# Patient Record
Sex: MALE | Race: BLACK OR AFRICAN AMERICAN | Employment: FULL TIME | URBAN - METROPOLITAN AREA
[De-identification: names, ages, dates, MRNs, and addresses within clinical notes are randomized per-mention and may not be internally consistent; named-entity substitution may affect disease eponyms.]

---

## 2017-04-04 ENCOUNTER — OFFICE VISIT (OUTPATIENT)
Dept: FAMILY MEDICINE | Facility: CLINIC | Age: 37
End: 2017-04-04
Payer: COMMERCIAL

## 2017-04-04 VITALS
DIASTOLIC BLOOD PRESSURE: 80 MMHG | HEART RATE: 88 BPM | TEMPERATURE: 98.9 F | WEIGHT: 153.4 LBS | RESPIRATION RATE: 12 BRPM | OXYGEN SATURATION: 100 % | HEIGHT: 71 IN | BODY MASS INDEX: 21.48 KG/M2 | SYSTOLIC BLOOD PRESSURE: 126 MMHG

## 2017-04-04 DIAGNOSIS — F17.200 TOBACCO USE DISORDER: ICD-10-CM

## 2017-04-04 DIAGNOSIS — M50.30 DDD (DEGENERATIVE DISC DISEASE), CERVICAL: ICD-10-CM

## 2017-04-04 DIAGNOSIS — R63.0 ANOREXIA: ICD-10-CM

## 2017-04-04 DIAGNOSIS — J10.1 INFLUENZA A: Primary | ICD-10-CM

## 2017-04-04 PROCEDURE — 99214 OFFICE O/P EST MOD 30 MIN: CPT | Performed by: FAMILY MEDICINE

## 2017-04-04 RX ORDER — PREDNISONE 20 MG/1
TABLET ORAL
Qty: 21 TABLET | Refills: 0 | Status: SHIPPED | OUTPATIENT
Start: 2017-04-04 | End: 2017-07-03

## 2017-04-04 ASSESSMENT — PAIN SCALES - GENERAL: PAINLEVEL: NO PAIN (0)

## 2017-04-04 NOTE — MR AVS SNAPSHOT
"              After Visit Summary   4/4/2017    Adrian Kurtz    MRN: [id number]           Patient Information     Date Of Birth          1980        Visit Information        Provider Department      4/4/2017 5:20 PM Charmaine Oliver MD [facility]        Today's Diagnoses     Influenza A    -  1    Anorexia        Tobacco use disorder        DDD (degenerative disc disease), cervical           Follow-ups after your visit        Follow-up notes from your care team     Return if symptoms worsen or fail to improve.      Future tests that were ordered for you today     Open Future Orders        Priority Expected Expires Ordered    XR Cervic/Thorac Transforaminal Inj Routine 4/4/2017 4/4/2018 4/4/2017            Who to contact     If you have questions or need follow up information about today's clinic visit or your schedule please contact [facility] directly at [phone].  Normal or non-critical lab and imaging results will be communicated to you by Lottayhart, letter or phone within 4 business days after the clinic has received the results. If you do not hear from us within 7 days, please contact the clinic through MyChart or phone. If you have a critical or abnormal lab result, we will notify you by phone as soon as possible.  Submit refill requests through [employer] or call your pharmacy and they will forward the refill request to us. Please allow 3 business days for your refill to be completed.          Additional Information About Your Visit        Lottayhart Information     [employer] lets you send messages to your doctor, view your test results, renew your prescriptions, schedule appointments and more. To sign up, go to [email or web address] . Click on \"Log in\" on the left side of the screen, which will take you to the Welcome page. Then click on \"Sign up Now\" on the right side of the page.     You will be asked to enter the access code listed below, as well as some personal " "information. Please follow the directions to create your username and password.     Your access code is: [email or web address]  Expires: 7/3/2017  5:35 PM     Your access code will  in 90 days. If you need help or a new code, please call your [place] clinic or [phone].        Care EveryWhere ID     This is your Care EveryWhere ID. This could be used by other organizations to access your [place] medical records  [id number]        Your Vitals Were     Pulse Temperature Respirations Height Pulse Oximetry BMI (Body Mass Index)    88 98.9  F (37.2  C) (Oral) 12 1.791 m (5' 10.5\") 100% 21.7 kg/m2       Blood Pressure from Last 3 Encounters:   17 126/80   16 104/60   04/06/15 118/76    Weight from Last 3 Encounters:   17 69.6 kg (153 lb 6.4 oz)   16 73.8 kg (162 lb 11.2 oz)   04/06/15 82.3 kg (181 lb 8 oz)                 Today's Medication Changes          These changes are accurate as of: 17  5:35 PM.  If you have any questions, ask your nurse or doctor.               Start taking these medicines.        Dose/Directions    predniSONE 20 MG tablet   Commonly known as:  DELTASONE   Used for:  Influenza A, Anorexia   Started by:  Charmaine Oliver MD        3 tabs daily for 4 days, 2 tabs x 3 days, 1 tab x 3 days   Quantity:  21 tablet   Refills:  0       varenicline 0.5 MG X 11 & 1 MG X 42 tablet   Commonly known as:  CHANTIX STARTING MONTH KATIE   Used for:  Tobacco use disorder   Started by:  Charmaine Oliver MD        Take 0.5 mg tab daily for 3 days, then 0.5 mg tab twice daily for 4 days, then 1 mg twice daily.   Quantity:  53 tablet   Refills:  0            Where to get your medicines      [facility] [address] [facility] [address] [facility]  [address] [facility] [address]    Hours:  24-hours Phone:  [phone]     predniSONE 20 MG tablet    varenicline 0.5 MG X 11 & 1 MG X 42 " tablet                Primary Care Provider Office Phone # Fax #    Charmaine Eliel Oliver [phone]       [facility] [address] [place] [address]        Thank you!     Thank you for choosing [facility]  for your care. Our goal is always to provide you with excellent care. Hearing back from our patients is one way we can continue to improve our services. Please take a few minutes to complete the written survey that you may receive in the mail after your visit with us. Thank you!             Your Updated Medication List - Protect others around you: Learn how to safely use, store and throw away your medicines at www.disposemymeds.org.          This list is accurate as of: 4/4/17  5:35 PM.  Always use your most recent med list.                   Brand Name Dispense Instructions for use    ALEVE 220 MG capsule   Generic drug:  naproxen sodium      Take 220 mg by mouth 2 times daily (with meals).       ibuprofen 200 MG capsule      Take 200 mg by mouth every 4 hours as needed.       predniSONE 20 MG tablet    DELTASONE    21 tablet    3 tabs daily for 4 days, 2 tabs x 3 days, 1 tab x 3 days       varenicline 0.5 MG X 11 & 1 MG X 42 tablet    CHANTIX STARTING MONTH KATIE    53 tablet    Take 0.5 mg tab daily for 3 days, then 0.5 mg tab twice daily for 4 days, then 1 mg twice daily.

## 2017-04-04 NOTE — PROGRESS NOTES
"  SUBJECTIVE:                                                    Adrian Kurtz is a 36 year old male who presents to clinic today for the following health issues:      ED/UC Followup:    Facility:  [facility]  Date of visit: 3/20/17, 3/13/17  Reason for visit: flu sx's - dx's with influenza A  Current Status: pt states feeling better, still experiencing low appetite -      SUBJECTIVE:  Here today in follow-up of influenza. Reviewed ER visits ×2 through care everywhere. Prescribed Tamiflu initially. Continue to trouble with poor appetite and vomiting. Was seen through [facility] where he was given Zofran. His flu symptoms have improved significantly. No more fevers or chills. Cough is fading. Still very poor appetite. He says he was given some steroids which really helped him feel better including increasing his appetite. I do not see any record of that in care everywhere however. But is wondering about another course of steroids. Also notes that he like to quit smoking.  He thinks his insurance will cover Chantix and he like to give that a try. Has a known history of degenerative cervical disc disease with an MRI in 2015 showing a right-sided C5-C6 disc. At one point he was going to set up an epidural injection but insurance was initiated. Continues to have neck pain that radiates down his right arm. No weakness or numbness.    Review of systems otherwise negative.  Past medical, family, and social history reviewed and updated in chart.    OBJECTIVE:  /80 (BP Location: Right arm, Patient Position: Right side, Cuff Size: Adult Regular)  Pulse 88  Temp 98.9  F (37.2  C) (Oral)  Resp 12  Ht 1.791 m (5' 10.5\")  Wt 69.6 kg (153 lb 6.4 oz)  SpO2 100%  BMI 21.7 kg/m2  Alert, pleasant, upbeat, and in no apparent discomfort.  Ears normal. Throat and pharynx normal. Neck supple. No adenopathy or masses in the neck or supraclavicular regions. Sinuses non tender.  S1 and S2 normal, no murmurs, " clicks, gallops or rubs. Regular rate and rhythm. Chest is clear; no wheezes or rales. No edema or JVD.  Past labs reviewed with the patient.     ASSESSMENT / PLAN:  (J10.1) Influenza A  (primary encounter diagnosis)  Comment: Discussed mechanism of action of the proposed medication, as well as potential effects, both good and bad.  Patient expressed understanding and agreed with treatment.   Plan: predniSONE (DELTASONE) 20 MG tablet            (R63.0) Anorexia  Comment: as above   Plan: predniSONE (DELTASONE) 20 MG tablet            (F17.200) Tobacco use disorder  Comment: Discussed mechanism of action of the proposed medication, as well as potential effects, both good and bad.  Patient expressed understanding and agreed with treatment.   Plan: varenicline (CHANTIX STARTING MONTH KATIE) 0.5 MG        X 11 & 1 MG X 42 tablet            (M50.30) DDD (degenerative disc disease), cervical  Comment: We will set up epidural injection and follow-up based upon this  Plan: XR Cervic/Thorac Transforaminal Inj            Follow up as needed   S. Eliel Oliver MD    (Chart documentation completed in part with Dragon voice-recognition software.  Even though reviewed some grammatical, spelling, and word errors may remain.)

## 2017-04-04 NOTE — NURSING NOTE
"Chief Complaint   Patient presents with     Urgent Care       Initial /80 (BP Location: Right arm, Patient Position: Right side, Cuff Size: Adult Regular)  Pulse 88  Temp 98.9  F (37.2  C) (Oral)  Resp 12  Ht 1.791 m (5' 10.5\")  Wt 69.6 kg (153 lb 6.4 oz)  SpO2 100%  BMI 21.7 kg/m2 Estimated body mass index is 21.7 kg/(m^2) as calculated from the following:    Height as of this encounter: 1.791 m (5' 10.5\").    Weight as of this encounter: 69.6 kg (153 lb 6.4 oz).  Medication Reconciliation: complete     Will Ghazal SOMMERS      "

## 2017-05-22 DIAGNOSIS — F17.200 TOBACCO USE DISORDER: ICD-10-CM

## 2017-05-22 DIAGNOSIS — M54.50 MIDLINE LOW BACK PAIN WITHOUT SCIATICA, UNSPECIFIED CHRONICITY: Primary | ICD-10-CM

## 2017-05-22 NOTE — TELEPHONE ENCOUNTER
[facility] Call Center    Phone Message    Name of Caller: Adrian    Phone Number: Home number on file [phone] (home) or Cell number on file:    No relevant phone numbers on file.       Best time to return call: ANy    May a detailed message be left on voicemail: yes    Relation to patient: Self    Reason for Call: Order(s): Other:   What is being requested: Requesting MRI orders   Reason for requested: Needs MRI in order get injection   Date needed: asap   Provider name: Dr. Oliver      Action Taken: Message routed to:  Other:  Clinic

## 2017-05-22 NOTE — TELEPHONE ENCOUNTER
Called patient - they are requesting an MRI be completed of back before epidural injection.    Pt also requesting refill of chantix (wants to start over) -- preferred pharmacy selected    Will Ghazal SOMMERS

## 2017-06-02 ENCOUNTER — TELEPHONE (OUTPATIENT)
Dept: FAMILY MEDICINE | Facility: CLINIC | Age: 37
End: 2017-06-02

## 2017-06-02 DIAGNOSIS — M50.30 DDD (DEGENERATIVE DISC DISEASE), CERVICAL: Primary | ICD-10-CM

## 2017-06-02 NOTE — TELEPHONE ENCOUNTER
Reason for Call: Request for an order or referral:    Order or referral being requested: MRI CERV    Date needed: as soon as possible    Has the patient been seen by the PCP for this problem? YES    Additional comments: PT IS AT [facility] AT  RIGHT NOW AND ORDERS SAY IS FOR LUMBAR AND PT STATES IT SHOULD BE FOR CERVICAL SPINE, NEED A NEW ORDER.  CLOSES AT 5 PM TODAY AND NEED ASAP    Phone number Patient can be reached at:  Other phone number:    [employer]  [phone]     IMAGING     Best Time:  RIGHT NOW    Can we leave a detailed message on this number?  YES    Call taken on 6/2/2017 at 4:35 PM by Mea Bianchi

## 2017-06-23 ENCOUNTER — RADIANT APPOINTMENT (OUTPATIENT)
Dept: GENERAL RADIOLOGY | Facility: CLINIC | Age: 37
End: 2017-06-23
Attending: FAMILY MEDICINE
Payer: COMMERCIAL

## 2017-06-23 VITALS — HEART RATE: 61 BPM | SYSTOLIC BLOOD PRESSURE: 108 MMHG | OXYGEN SATURATION: 97 % | DIASTOLIC BLOOD PRESSURE: 77 MMHG

## 2017-06-23 DIAGNOSIS — M50.30 DDD (DEGENERATIVE DISC DISEASE), CERVICAL: ICD-10-CM

## 2017-06-23 PROCEDURE — 62321 NJX INTERLAMINAR CRV/THRC: CPT | Performed by: RADIOLOGY

## 2017-06-23 RX ORDER — IOPAMIDOL 408 MG/ML
10 INJECTION, SOLUTION INTRATHECAL ONCE
Status: COMPLETED | OUTPATIENT
Start: 2017-06-23 | End: 2017-06-23

## 2017-06-23 RX ORDER — BETAMETHASONE SODIUM PHOSPHATE AND BETAMETHASONE ACETATE 3; 3 MG/ML; MG/ML
6 INJECTION, SUSPENSION INTRA-ARTICULAR; INTRALESIONAL; INTRAMUSCULAR; SOFT TISSUE ONCE
Status: COMPLETED | OUTPATIENT
Start: 2017-06-23 | End: 2017-06-23

## 2017-06-23 RX ADMIN — IOPAMIDOL 1 ML: 408 INJECTION, SOLUTION INTRATHECAL at 15:30

## 2017-06-23 RX ADMIN — BETAMETHASONE SODIUM PHOSPHATE AND BETAMETHASONE ACETATE 3 ML: 3; 3 INJECTION, SUSPENSION INTRA-ARTICULAR; INTRALESIONAL; INTRAMUSCULAR; SOFT TISSUE at 15:30

## 2017-06-23 NOTE — PROGRESS NOTES
: Adrian was seen in X-ray today for a cervical epidural injection. Patient rated pain before procedure 7/10. After procedure patient rated pain 5/10. This pain level is acceptable to patient. Patient discharged home with his wife.

## 2017-06-30 ENCOUNTER — RADIANT APPOINTMENT (OUTPATIENT)
Dept: MRI IMAGING | Facility: CLINIC | Age: 37
End: 2017-06-30
Attending: FAMILY MEDICINE
Payer: COMMERCIAL

## 2017-06-30 DIAGNOSIS — M54.2 NECK PAIN: ICD-10-CM

## 2017-06-30 PROCEDURE — 72141 MRI NECK SPINE W/O DYE: CPT | Performed by: RADIOLOGY

## 2017-07-03 ENCOUNTER — TELEPHONE (OUTPATIENT)
Dept: FAMILY MEDICINE | Facility: CLINIC | Age: 37
End: 2017-07-03

## 2017-07-03 DIAGNOSIS — M54.12 CERVICAL RADICULOPATHY: Primary | ICD-10-CM

## 2017-07-03 RX ORDER — PREDNISONE 20 MG/1
TABLET ORAL
Qty: 21 TABLET | Refills: 0 | Status: SHIPPED | OUTPATIENT
Start: 2017-07-03 | End: 2017-12-22

## 2017-07-03 NOTE — TELEPHONE ENCOUNTER
"Okay to send a copy of the results as well. Looks like it was about the same as the last one in 2015. Still mild narrowing of the canal at C5-C6 and C6-C7  So no significant change that would make surgery \"necessary\" - I'm sure it is still an option if the epidural steroids don't work  "

## 2017-07-03 NOTE — TELEPHONE ENCOUNTER
Reason for Call:  Request for results:    Name of test or procedure: wants mri results for spine and neck call back    Date of test of procedure: 6/30/17    Location of the test or procedure: [facility]    OK to leave the result message on voice mail or with a family member? No I want to talk with MD    Phone number Patient can be reached at:  Home number on file [phone] (home)    Additional comments: any    Call taken on 7/3/2017 at 4:26 PM by Essence Navarro

## 2017-07-03 NOTE — TELEPHONE ENCOUNTER
Informed pt of message below. Pt says the steroids are not working wondering if he can get a double dose the next injection.     Mailed results.      Pt also requesting an rx  for prednisone as well?    Deepa Uriostegui MA

## 2017-08-11 ENCOUNTER — RADIANT APPOINTMENT (OUTPATIENT)
Dept: GENERAL RADIOLOGY | Facility: CLINIC | Age: 37
End: 2017-08-11
Attending: FAMILY MEDICINE
Payer: COMMERCIAL

## 2017-08-11 VITALS — SYSTOLIC BLOOD PRESSURE: 116 MMHG | DIASTOLIC BLOOD PRESSURE: 70 MMHG | HEART RATE: 77 BPM | OXYGEN SATURATION: 99 %

## 2017-08-11 DIAGNOSIS — M50.30 DDD (DEGENERATIVE DISC DISEASE), CERVICAL: ICD-10-CM

## 2017-08-11 PROCEDURE — 64479 NJX AA&/STRD TFRM EPI C/T 1: CPT | Performed by: RADIOLOGY

## 2017-08-11 RX ORDER — DEXAMETHASONE SODIUM PHOSPHATE 10 MG/ML
10 INJECTION, SOLUTION INTRAMUSCULAR; INTRAVENOUS EVERY 6 HOURS
Status: SHIPPED | OUTPATIENT
Start: 2017-08-11

## 2017-08-11 RX ORDER — DEXAMETHASONE SODIUM PHOSPHATE 10 MG/ML
10 INJECTION, SOLUTION INTRAMUSCULAR; INTRAVENOUS ONCE
Status: COMPLETED | OUTPATIENT
Start: 2017-08-11 | End: 2017-08-11

## 2017-08-11 RX ORDER — IOPAMIDOL 408 MG/ML
10 INJECTION, SOLUTION INTRATHECAL ONCE
Status: COMPLETED | OUTPATIENT
Start: 2017-08-11 | End: 2017-08-11

## 2017-08-11 RX ADMIN — IOPAMIDOL 1 ML: 408 INJECTION, SOLUTION INTRATHECAL at 11:55

## 2017-08-11 RX ADMIN — DEXAMETHASONE SODIUM PHOSPHATE 10 MG: 10 INJECTION, SOLUTION INTRAMUSCULAR; INTRAVENOUS at 11:55

## 2017-08-11 NOTE — PROGRESS NOTES
: Adrian was seen in X-ray today for a cervical transforaminal injection. Patient rated pain before procedure 6/10. After procedure patient rated pain 1/10. This pain level is acceptable to patient. Patient discharged home with his wife.

## 2017-12-15 ENCOUNTER — TELEPHONE (OUTPATIENT)
Dept: FAMILY MEDICINE | Facility: CLINIC | Age: 37
End: 2017-12-15

## 2017-12-15 NOTE — TELEPHONE ENCOUNTER
Reason for Call:  Medication or medication refill:    Do you use a [facility]?  Name of the pharmacy and phone number for the current request:  Written rx    Name of the medication requested: Vicodin no listed in profile     Other request: Please call when approved so he can pickup prescription.    Can we leave a detailed message on this number? YES    Phone number patient can be reached at: Home number on file [phone] (home)    Best Time: any    Call taken on 12/15/2017 at 11:17 AM by Essence Navarro

## 2017-12-15 NOTE — TELEPHONE ENCOUNTER
Norco    Discontinued  Last Written Prescription Date: 02/25/15  Last Fill Quantity: 30,  # refills: 0   Last Office Visit with [facility] or [facility] prescribing provider: 04/04/17                                         Next 5 appointments (look out 90 days)     Dec 22, 2017  9:40 AM CST   PHYSICAL with Eryn Kirk PA-C   [facility] ([facility])    [address] [place] [address]   [phone]                    Routing refill request to provider for review/approval because:  Drug not on the [facility] refill protocol   Drug not active on patient's medication list  Annie Daniels RN

## 2017-12-15 NOTE — TELEPHONE ENCOUNTER
Refill denied - I've never prescribed this for the patient doesn't look like it has been prescribed in well over a year. He would need to be seen in person for this

## 2017-12-22 ENCOUNTER — OFFICE VISIT (OUTPATIENT)
Dept: FAMILY MEDICINE | Facility: CLINIC | Age: 37
End: 2017-12-22
Payer: COMMERCIAL

## 2017-12-22 VITALS
RESPIRATION RATE: 17 BRPM | WEIGHT: 160 LBS | OXYGEN SATURATION: 100 % | DIASTOLIC BLOOD PRESSURE: 60 MMHG | SYSTOLIC BLOOD PRESSURE: 100 MMHG | BODY MASS INDEX: 22.9 KG/M2 | TEMPERATURE: 98.1 F | HEIGHT: 70 IN | HEART RATE: 86 BPM

## 2017-12-22 DIAGNOSIS — Z13.1 SCREENING FOR DIABETES MELLITUS: ICD-10-CM

## 2017-12-22 DIAGNOSIS — F17.200 TOBACCO USE DISORDER: ICD-10-CM

## 2017-12-22 DIAGNOSIS — E55.9 VITAMIN D DEFICIENCY: ICD-10-CM

## 2017-12-22 DIAGNOSIS — Z13.220 SCREENING FOR HYPERLIPIDEMIA: ICD-10-CM

## 2017-12-22 DIAGNOSIS — Z11.3 SCREENING FOR STDS (SEXUALLY TRANSMITTED DISEASES): ICD-10-CM

## 2017-12-22 DIAGNOSIS — Z13.21 ENCOUNTER FOR VITAMIN DEFICIENCY SCREENING: ICD-10-CM

## 2017-12-22 DIAGNOSIS — M54.12 CERVICAL RADICULOPATHY: ICD-10-CM

## 2017-12-22 DIAGNOSIS — Z00.01 ENCOUNTER FOR ROUTINE ADULT HEALTH EXAMINATION WITH ABNORMAL FINDINGS: Primary | ICD-10-CM

## 2017-12-22 LAB
ALBUMIN SERPL-MCNC: 4.3 G/DL (ref 3.4–5)
ALP SERPL-CCNC: 50 U/L (ref 40–150)
ALT SERPL W P-5'-P-CCNC: 23 U/L (ref 0–70)
ANION GAP SERPL CALCULATED.3IONS-SCNC: 7 MMOL/L (ref 3–14)
AST SERPL W P-5'-P-CCNC: 18 U/L (ref 0–45)
BILIRUB SERPL-MCNC: 0.9 MG/DL (ref 0.2–1.3)
BUN SERPL-MCNC: 6 MG/DL (ref 7–30)
CALCIUM SERPL-MCNC: 9.4 MG/DL (ref 8.5–10.1)
CHLORIDE SERPL-SCNC: 104 MMOL/L (ref 94–109)
CHOLEST SERPL-MCNC: 217 MG/DL
CO2 SERPL-SCNC: 27 MMOL/L (ref 20–32)
CREAT SERPL-MCNC: 1.23 MG/DL (ref 0.66–1.25)
GFR SERPL CREATININE-BSD FRML MDRD: 66 ML/MIN/1.7M2
GLUCOSE SERPL-MCNC: 93 MG/DL (ref 70–99)
HBV SURFACE AB SERPL IA-ACNC: 4.35 M[IU]/ML
HBV SURFACE AG SERPL QL IA: NONREACTIVE
HCV AB SERPL QL IA: NONREACTIVE
HDLC SERPL-MCNC: 55 MG/DL
HIV 1+2 AB+HIV1 P24 AG SERPL QL IA: NONREACTIVE
LDLC SERPL CALC-MCNC: 139 MG/DL
NONHDLC SERPL-MCNC: 162 MG/DL
POTASSIUM SERPL-SCNC: 4.5 MMOL/L (ref 3.4–5.3)
PROT SERPL-MCNC: 7.6 G/DL (ref 6.8–8.8)
SODIUM SERPL-SCNC: 138 MMOL/L (ref 133–144)
TRIGL SERPL-MCNC: 114 MG/DL

## 2017-12-22 PROCEDURE — 86780 TREPONEMA PALLIDUM: CPT | Performed by: PHYSICIAN ASSISTANT

## 2017-12-22 PROCEDURE — 36415 COLL VENOUS BLD VENIPUNCTURE: CPT | Performed by: PHYSICIAN ASSISTANT

## 2017-12-22 PROCEDURE — 99214 OFFICE O/P EST MOD 30 MIN: CPT | Mod: 25 | Performed by: PHYSICIAN ASSISTANT

## 2017-12-22 PROCEDURE — 87389 HIV-1 AG W/HIV-1&-2 AB AG IA: CPT | Performed by: PHYSICIAN ASSISTANT

## 2017-12-22 PROCEDURE — 87340 HEPATITIS B SURFACE AG IA: CPT | Performed by: PHYSICIAN ASSISTANT

## 2017-12-22 PROCEDURE — 80053 COMPREHEN METABOLIC PANEL: CPT | Performed by: PHYSICIAN ASSISTANT

## 2017-12-22 PROCEDURE — 87591 N.GONORRHOEAE DNA AMP PROB: CPT | Performed by: PHYSICIAN ASSISTANT

## 2017-12-22 PROCEDURE — 86706 HEP B SURFACE ANTIBODY: CPT | Performed by: PHYSICIAN ASSISTANT

## 2017-12-22 PROCEDURE — 87491 CHLMYD TRACH DNA AMP PROBE: CPT | Performed by: PHYSICIAN ASSISTANT

## 2017-12-22 PROCEDURE — 82306 VITAMIN D 25 HYDROXY: CPT | Performed by: PHYSICIAN ASSISTANT

## 2017-12-22 PROCEDURE — 80061 LIPID PANEL: CPT | Performed by: PHYSICIAN ASSISTANT

## 2017-12-22 PROCEDURE — 86803 HEPATITIS C AB TEST: CPT | Performed by: PHYSICIAN ASSISTANT

## 2017-12-22 PROCEDURE — 99395 PREV VISIT EST AGE 18-39: CPT | Performed by: PHYSICIAN ASSISTANT

## 2017-12-22 RX ORDER — HYDROCODONE BITARTRATE AND ACETAMINOPHEN 5; 325 MG/1; MG/1
TABLET ORAL
Qty: 60 TABLET | Refills: 0 | Status: SHIPPED | OUTPATIENT
Start: 2017-12-22 | End: 2018-01-22

## 2017-12-22 ASSESSMENT — PAIN SCALES - GENERAL: PAINLEVEL: SEVERE PAIN (7)

## 2017-12-22 NOTE — MR AVS SNAPSHOT
After Visit Summary   12/22/2017    Adrian Kurtz    MRN: [id number]           Patient Information     Date Of Birth          1980        Visit Information        Provider Department      12/22/2017 9:40 AM Eryn Kirk PA-C [facility]        Today's Diagnoses     Cervical radiculopathy    -  1    Encounter for routine adult health examination with abnormal findings        Screening for hyperlipidemia        Screening for diabetes mellitus        Encounter for vitamin deficiency screening        Screening for STDs (sexually transmitted diseases)        Tobacco use disorder          Care Instructions    Follow up with orthopedic surgeon.   Work on quitting smoking. Set quit date and start chantix 2 weeks before quit date   Follow up with us in one month if need more pain medication.       At [facility], we strive to deliver an exceptional experience to you, every time we see you.  If you receive a survey in the mail, please send us back your thoughts. We really do value your feedback.    Suggested websites for health information:  [employer] : Up to date and easily searchable information on multiple topics.  Www.medlineplus.gov : medication info, interactive tutorials, watch real surgeries online  Www.familydoctor.org : good info from the Academy of Family Physicians  Www.cdc.gov : public health info, travel advisories, epidemics (H1N1)  Www.aap.org : children's health info, normal development, vaccinations  Www.health.state.[place].us : [place] dept of health, public health issues in [place], N1N1    Your care team:     Family Medicine   DREW Serrano MD Emily Bunt, APRN CNP   S. MD Hattie Shi MD Angela Wermerskirchen, MD         Clinic hours: Monday - Wednesday 7 am-7 pm   Thursdays and Fridays 7 am-5 pm.     [facility]: Monday - Friday 11 am-9 pm,   Saturday and Sunday 9 am-5  pm.    [facility]: Monday -Thursday 8 am-8 pm; Friday 8 am-6 pm; Saturday and Sunday 9 am-5 pm.     [facility]: Monday - Thursday 8 am - 7 pm; Friday 8 am - 6 pm    Clinic: [phone]   [facility]: [phone]   [facility]: [phone]          Preventive Health Recommendations  Male Ages 26 - 39    Yearly exam:             See your health care provider every year in order to  o   Review health changes.   o   Discuss preventive care.    o   Review your medicines if your doctor has prescribed any.    You should be tested each year for STDs (sexually transmitted diseases), if you re at risk.     After age 35, talk to your provider about cholesterol testing. If you are at risk for heart disease, have your cholesterol tested at least every 5 years.     If you are at risk for diabetes, you should have a diabetes test (fasting glucose).  Shots: Get a flu shot each year. Get a tetanus shot every 10 years.     Nutrition:    Eat at least 5 servings of fruits and vegetables daily.     Eat whole-grain bread, whole-wheat pasta and brown rice instead of white grains and rice.     Talk to your provider about Calcium and Vitamin D.     Lifestyle    Exercise for at least 150 minutes a week (30 minutes a day, 5 days a week). This will help you control your weight and prevent disease.     Limit alcohol to one drink per day.     No smoking.     Wear sunscreen to prevent skin cancer.     See your dentist every six months for an exam and cleaning.             Follow-ups after your visit        Additional Services     ORTHO  REFERRAL       [facility] is referring you to the Orthopedic  Services at [facility].       The  Representative will assist you in the coordination of your Orthopedic and Musculoskeletal Care as prescribed by your physician.    The  Representative will call you within 1  "business day to help schedule your appointment, or you may contact the  Representative at:    All areas ~ [phone]     Type of Referral : Spine: Cervical / Thoracic: Cervical / Thoracic Spine Surgeon        Timeframe requested: Routine    Coverage of these services is subject to the terms and limitations of your health insurance plan.  Please call member services at your health plan with any benefit or coverage questions.      If X-rays, CT or MRI's have been performed, please contact the facility where they were done to arrange for , prior to your scheduled appointment.  Please bring this referral request to your appointment and present it to your specialist.                  Who to contact     If you have questions or need follow up information about today's clinic visit or your schedule please contact [facility] directly at [phone].  Normal or non-critical lab and imaging results will be communicated to you by trinkethart, letter or phone within 4 business days after the clinic has received the results. If you do not hear from us within 7 days, please contact the clinic through [employer] or phone. If you have a critical or abnormal lab result, we will notify you by phone as soon as possible.  Submit refill requests through [employer] or call your pharmacy and they will forward the refill request to us. Please allow 3 business days for your refill to be completed.          Additional Information About Your Visit        [employer] Information     [employer] lets you send messages to your doctor, view your test results, renew your prescriptions, schedule appointments and more. To sign up, go to [email or web address] . Click on \"Log in\" on the left side of the screen, which will take you to the Welcome page. Then click on \"Sign up Now\" on the right side of the page.     You will be asked to enter the access code listed below, as well as some personal information. Please follow the " "directions to create your username and password.     Your access code is: [email or web address]  Expires: 3/22/2018 10:25 AM     Your access code will  in 90 days. If you need help or a new code, please call your [place] clinic or [phone].        Care EveryWhere ID     This is your Care EveryWhere ID. This could be used by other organizations to access your [place] medical records  [id number]        Your Vitals Were     Pulse Temperature Respirations Height Pulse Oximetry BMI (Body Mass Index)    86 98.1  F (36.7  C) (Oral) 17 1.765 m (5' 9.5\") 100% 23.29 kg/m2       Blood Pressure from Last 3 Encounters:   17 100/60   17 116/70   17 108/77    Weight from Last 3 Encounters:   17 72.6 kg (160 lb)   17 69.6 kg (153 lb 6.4 oz)   16 73.8 kg (162 lb 11.2 oz)              We Performed the Following     25 Hydroxyvitamin D2 and D3     Anti Treponema     Chlamydia trachomatis PCR     Comprehensive metabolic panel     Hepatitis B Surface Antibody     Hepatitis B surface antigen     Hepatitis C antibody     HIV Antigen Antibody Combo     Lipid panel reflex to direct LDL Fasting     Neisseria gonorrhoeae PCR     ORTHO  REFERRAL          Today's Medication Changes          These changes are accurate as of: 17 10:32 AM.  If you have any questions, ask your nurse or doctor.               Start taking these medicines.        Dose/Directions    HYDROcodone-acetaminophen 5-325 MG per tablet   Commonly known as:  NORCO   Used for:  Cervical radiculopathy   Started by:  Eryn Kirk PA-C        1-2  At bedtime maximum 2 tablet(s) per day   Quantity:  60 tablet   Refills:  0            Where to get your medicines      These medications were sent to [facility] [id number] [facility] [address] [facility] [facility]  [address] [facility] [address]    Hours:  24-hours Phone:  [phone]     varenicline 0.5 MG X 11 & 1 MG X 42 " tablet         Some of these will need a paper prescription and others can be bought over the counter.  Ask your nurse if you have questions.     Bring a paper prescription for each of these medications     HYDROcodone-acetaminophen 5-325 MG per tablet                Primary Care Provider Office Phone # Fax #    Charmaine Eliel Oliver [phone] [address] [facility] [address]        Equal Access to Services     AURELIO BAILEY : Hadii aad ku hadasho Soomaali, waaxda luqadaha, qaybta kaalmada adeegyada, waxay idiin hayaan adeeg kharash la'aan ah. So [facility] [phone].    ATENCIÓN: Si habla esppatricia, tiene a goldberg disposición servicios gratuitos de asistencia lingüística. Llame al [phone].    We comply with applicable federal civil rights laws and [place] laws. We do not discriminate on the basis of race, color, national origin, age, disability, sex, sexual orientation, or gender identity.            Thank you!     Thank you for choosing [facility]  for your care. Our goal is always to provide you with excellent care. Hearing back from our patients is one way we can continue to improve our services. Please take a few minutes to complete the written survey that you may receive in the mail after your visit with us. Thank you!             Your Updated Medication List - Protect others around you: Learn how to safely use, store and throw away your medicines at www.disposemymeds.org.          This list is accurate as of: 12/22/17 10:32 AM.  Always use your most recent med list.                   Brand Name Dispense Instructions for use Diagnosis    ALEVE 220 MG capsule   Generic drug:  naproxen sodium      Take 220 mg by mouth 2 times daily (with meals).        HYDROcodone-acetaminophen 5-325 MG per tablet    NORCO    60 tablet    1-2  At bedtime maximum 2 tablet(s) per day    Cervical radiculopathy       ibuprofen 200 MG capsule      Take 200 mg by mouth every 4 hours as  needed.        varenicline 0.5 MG X 11 & 1 MG X 42 tablet    CHANTIX STARTING MONTH PAK    53 tablet    Take 0.5 mg tab daily for 3 days, then 0.5 mg tab twice daily for 4 days, then 1 mg twice daily.    Tobacco use disorder

## 2017-12-22 NOTE — PATIENT INSTRUCTIONS
Follow up with orthopedic surgeon.   Work on quitting smoking. Set quit date and start chantix 2 weeks before quit date   Follow up with us in one month if need more pain medication.       At [facility], we strive to deliver an exceptional experience to you, every time we see you.  If you receive a survey in the mail, please send us back your thoughts. We really do value your feedback.    Suggested websites for health information:  Www.[email or web address] : Up to date and easily searchable information on multiple topics.  Www.medlineplus.gov : medication info, interactive tutorials, watch real surgeries online  Www.familydoctor.org : good info from the Academy of Family Physicians  Www.cdc.gov : public health info, travel advisories, epidemics (H1N1)  Www.aap.org : children's health info, normal development, vaccinations  Www.health.[facility].[place].us : [place] dept of health, public health issues in [place], N1N1    Your care team:     Family Medicine   DREW Serrano MD Emily Bunt, APRN [facility]   S. MD Hattie Shi MD Angela Wermerskirchen, MD         Clinic hours: Monday - Wednesday 7 am-7 pm   Thursdays and Fridays 7 am-5 pm.     [facility]: Monday - Friday 11 am-9 pm,   Saturday and Sunday 9 am-5 pm.    [facility]: Monday -Thursday 8 am-8 pm; Friday 8 am-6 pm; Saturday and Sunday 9 am-5 pm.     [facility]: Monday Thursday 8 am   7 pm; Friday 8 am   6 pm    Clinic: [phone]   [facility]: [phone]   [facility]: [phone]          Preventive Health Recommendations  Male Ages 26 - 39    Yearly exam:             See your health care provider every year in order to  o   Review health changes.   o   Discuss preventive care.    o   Review your medicines if your doctor has prescribed any.    You should be tested each year for STDs (sexually transmitted diseases), if you re at  risk.     After age 35, talk to your provider about cholesterol testing. If you are at risk for heart disease, have your cholesterol tested at least every 5 years.     If you are at risk for diabetes, you should have a diabetes test (fasting glucose).  Shots: Get a flu shot each year. Get a tetanus shot every 10 years.     Nutrition:    Eat at least 5 servings of fruits and vegetables daily.     Eat whole-grain bread, whole-wheat pasta and brown rice instead of white grains and rice.     Talk to your provider about Calcium and Vitamin D.     Lifestyle    Exercise for at least 150 minutes a week (30 minutes a day, 5 days a week). This will help you control your weight and prevent disease.     Limit alcohol to one drink per day.     No smoking.     Wear sunscreen to prevent skin cancer.     See your dentist every six months for an exam and cleaning.

## 2017-12-22 NOTE — NURSING NOTE
"Chief Complaint   Patient presents with     Physical       Initial /60 (BP Location: Right arm, Patient Position: Chair, Cuff Size: Adult Large)  Pulse 86  Temp 98.1  F (36.7  C) (Oral)  Resp 17  Ht 1.765 m (5' 9.5\")  Wt 72.6 kg (160 lb)  SpO2 100%  BMI 23.29 kg/m2 Estimated body mass index is 23.29 kg/(m^2) as calculated from the following:    Height as of this encounter: 1.765 m (5' 9.5\").    Weight as of this encounter: 72.6 kg (160 lb).  Medication Reconciliation: complete     Tita Andrade MA       "

## 2017-12-22 NOTE — PROGRESS NOTES
SUBJECTIVE:   CC: Adrian Kurtz is an 37 year old male who presents for preventative health visit.     Healthy Habits:    Do you get at least three servings of calcium containing foods daily (dairy, green leafy vegetables, etc.)? no    Amount of exercise or daily activities, outside of work: none    Problems taking medications regularly No    Medication side effects: No    Have you had an eye exam in the past two years? no    Do you see a dentist twice per year? 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 Yes - AUDIT SCORE:     No flowsheet data found.                          Last PSA: No results found for: PSA    Reviewed orders with patient. Reviewed health maintenance and updated orders accordingly - Yes  BP Readings from Last 3 Encounters:   12/22/17 100/60   08/11/17 116/70   06/23/17 108/77    Wt Readings from Last 3 Encounters:   12/22/17 72.6 kg (160 lb)   04/04/17 69.6 kg (153 lb 6.4 oz)   07/27/16 73.8 kg (162 lb 11.2 oz)                  Patient Active Problem List   Diagnosis     Hyperlipidemia with target LDL less than 130     AR (allergic rhinitis)     Eczema     Hemorrhoids     Contusion, forearm     Laceration of left forearm     Cervicalgia     Right arm pain     Cervical radiculopathy     DDD (degenerative disc disease), cervical     Anxiety state     Insomnia      "Tobacco use disorder     Past Surgical History:   Procedure Laterality Date     OPEN REDUCTION INTERNAL FIXATION WRIST  6-04    Right wrist fracture with ORIF       Social History   Substance Use Topics     Smoking status: Current Every Day Smoker     Packs/day: 1.00     Years: 12.00     Types: Cigarettes     Smokeless tobacco: Never Used     Alcohol use No      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 Maternal Grandmother 71     C.A.D. Paternal Grandfather 66     Respiratory Brother      Sarcoidosis     CANCER Other      lung cancer uncles on both sides, pancreatic cancer - uncle, liver cancer aunt     GASTROINTESTINAL DISEASE Other      Uncle cirrhosis - alcohol     Endocrine Disease Other      nephew without parathyroid glands     Musculoskeletal Disorder Other      osteogenesis imperfecta - aunt     Hypertension No family hx of      Thyroid Disease No family hx of          Current Outpatient Prescriptions   Medication Sig Dispense Refill     varenicline (CHANTIX STARTING MONTH PAK) 0.5 MG X 11 & 1 MG X 42 tablet Take 0.5 mg tab daily for 3 days, then 0.5 mg tab twice daily for 4 days, then 1 mg twice daily. 53 tablet 0            ibuprofen 200 MG capsule Take 200 mg by mouth every 4 hours as needed.       naproxen sodium (ALEVE) 220 MG capsule Take 220 mg by mouth 2 times daily (with meals).             Reviewed and updated as needed this visit by clinical staffTobacco  Allergies  Meds  Med Hx  Surg Hx  Fam Hx  Soc Hx        Reviewed and updated as needed this visit by Provider  Allergies  Meds  Problems  Med Hx  Surg Hx  Fam Hx        Would like referral for cervical spine surgeon.  Has had epidural steroid injections and pain is worse.  Ibuprofen without relief of symptoms. No history of injury.  \"slipped disc in neck since 2012\".  Has " "never seen spine specialist.    Complains of severe pain in neck and shooting down right arm.  Complains of numbness and tingling in fingers and constant pain.  Is right hand dominant.  Works as .  Takes aleve in AM and ibuprofen at night.   Rates pain 7-8/10 sitting here and 10/10 at night.    Pain worse last 2 weeks- not sleeping due to pain    Also requests sexually transmitted disease testing.  Same opposite partner last year and no history of sexually transmitted disease.     Would like chantix for smoking cessation.  Has had prescription in past but never filled.  Smokes ppd     ROS:  C: NEGATIVE for fever, chills, change in weight  I: NEGATIVE for worrisome rashes, moles or lesions  E: NEGATIVE for vision changes or irritation  ENT: NEGATIVE for ear, mouth and throat problems  R: NEGATIVE for significant cough or SOB  CV: NEGATIVE for chest pain, palpitations or peripheral edema  GI: NEGATIVE for nausea, abdominal pain, heartburn, or change in bowel habits   male: negative for dysuria, hematuria, decreased urinary stream, erectile dysfunction, urethral discharge  MUSCULOSKELETAL:as above   N: NEGATIVE for weakness, dizziness or paresthesias  P: NEGATIVE for changes in mood or affect    OBJECTIVE:   /60 (BP Location: Right arm, Patient Position: Chair, Cuff Size: Adult Large)  Pulse 86  Temp 98.1  F (36.7  C) (Oral)  Resp 17  Ht 1.765 m (5' 9.5\")  Wt 72.6 kg (160 lb)  SpO2 100%  BMI 23.29 kg/m2  EXAM:  GENERAL: healthy, alert and no distress  EYES: Eyes grossly normal to inspection, PERRL and conjunctivae and sclerae normal  HENT: ear canals and TM's normal, nose and mouth without ulcers or lesions  NECK: no adenopathy, no asymmetry, masses, or scars and thyroid normal to palpation  RESP: lungs clear to auscultation - no rales, rhonchi or wheezes  CV: regular rate and rhythm, normal S1 S2, no S3 or S4, no murmur, click or rub, no peripheral edema and peripheral pulses " strong  ABDOMEN: soft, nontender, no hepatosplenomegaly, no masses and bowel sounds normal   (male): normal male genitalia without lesions or urethral discharge, no hernia  MS: tender diffuse cervical spine and to right of cervical spine and trapezius.  Normal hand  +5/5 and normal gross sensation.  Radial pulse +2   SKIN: no suspicious lesions or rashes  NEURO: Normal strength and tone, mentation intact and speech normal  PSYCH: mentation appears normal, affect normal/bright, judgement and insight intact and appearance well groomed    ASSESSMENT/PLAN:   1. Encounter for routine adult health examination with abnormal findings    - Lipid panel reflex to direct LDL Fasting  - Comprehensive metabolic panel  - 25 Hydroxyvitamin D2 and D3    2. Cervical radiculopathy  Reviewed  and no recent narcotic prescriptions  Encouraged just to take vicodin at night.  Although chart indicates allergy to percocet reports has tolerated vicodin without problem in the past   Follow up with spine surgeon since has failed epidural steroid injections  - 25 Hydroxyvitamin D2 and D3  - ORTHO  REFERRAL  - HYDROcodone-acetaminophen (NORCO) 5-325 MG per tablet; 1-2  At bedtime maximum 2 tablet(s) per day  Dispense: 60 tablet; Refill: 0    3. Screening for hyperlipidemia    - Lipid panel reflex to direct LDL Fasting    4. Screening for diabetes mellitus    - Comprehensive metabolic panel    5. Encounter for vitamin deficiency screening    - 25 Hydroxyvitamin D2 and D3    6. Screening for STDs (sexually transmitted diseases)    - Anti Treponema  - Neisseria gonorrhoeae PCR  - Chlamydia trachomatis PCR  - Hepatitis C antibody  - Hepatitis B Surface Antibody  - Hepatitis B surface antigen  - HIV Antigen Antibody Combo    7. Tobacco use disorder  Encouraged to set quit date and start chantix 2 weeks prior   - varenicline (CHANTIX STARTING MONTH KATIE) 0.5 MG X 11 & 1 MG X 42 tablet; Take 0.5 mg tab daily for 3 days, then 0.5 mg tab  "twice daily for 4 days, then 1 mg twice daily.  Dispense: 53 tablet; Refill: 0    COUNSELING:  Reviewed preventive health counseling, as reflected in patient instructions       Regular exercise       Healthy diet/nutrition       Vision screening       Immunizations    Declined: Influenza due to Concerns about side effects/safety             Safe sex practices/STD prevention       HIV screeninx in teen years, 1x in adult years, and at intervals if high risk         reports that he has been smoking Cigarettes.  He has a 12.00 pack-year smoking history. He has never used smokeless tobacco.  Tobacco Cessation Action Plan: Pharmacotherapies : Chantix  Estimated body mass index is 23.29 kg/(m^2) as calculated from the following:    Height as of this encounter: 1.765 m (5' 9.5\").    Weight as of this encounter: 72.6 kg (160 lb).         Counseling Resources:  ATP IV Guidelines  Pooled Cohorts Equation Calculator  FRAX Risk Assessment  ICSI Preventive Guidelines  Dietary Guidelines for Americans,   USDA's MyPlate  ASA Prophylaxis  Lung CA Screening    Eryn Kirk PA-C  [facility]  "

## 2017-12-23 LAB — T PALLIDUM IGG+IGM SER QL: NEGATIVE

## 2017-12-24 LAB
C TRACH DNA SPEC QL NAA+PROBE: NEGATIVE
N GONORRHOEA DNA SPEC QL NAA+PROBE: NEGATIVE
SPECIMEN SOURCE: NORMAL
SPECIMEN SOURCE: NORMAL

## 2017-12-26 LAB
DEPRECATED CALCIDIOL+CALCIFEROL SERPL-MC: <19 UG/L (ref 20–75)
VITAMIN D2 SERPL-MCNC: <5 UG/L
VITAMIN D3 SERPL-MCNC: 14 UG/L

## 2017-12-27 ENCOUNTER — TELEPHONE (OUTPATIENT)
Dept: FAMILY MEDICINE | Facility: CLINIC | Age: 37
End: 2017-12-27

## 2017-12-27 RX ORDER — ERGOCALCIFEROL 1.25 MG/1
50000 CAPSULE, LIQUID FILLED ORAL
Qty: 8 CAPSULE | Refills: 0 | Status: SHIPPED | OUTPATIENT
Start: 2017-12-27 | End: 2018-02-15

## 2017-12-27 NOTE — PROGRESS NOTES
Please call patient re:  Very low vitamin D.  I am sending the once weekly dose now.   See recommendations below:    Your vitamin D level is very low.  This can contribute to symptoms of fatigue, depression, and muscle pain.   I would recommend an increase in vitamin D supplement to 50,000  IU weekly for 8 weeks and then  2000 IU daily.   The 50,000 IU dosing prescription is sent to your pharmacy.  The 2000 IU dose can be obtained over the counter of as a prescription.  If you desire a prescription, please call the office and this will be sent to your pharmacy.   Recheck of this is needed in 4 months.  Please call or MyChart message me if you have any questions.  SANDHYAK

## 2017-12-27 NOTE — TELEPHONE ENCOUNTER
Patient returned call to clinic. Results note below reviewed and relayed to patient. Patient verbalized understanding of results and care plan. Patient will  prescription and start 50,000 IU of vitamin D weekly. Patient verbalized understanding to take for 8 weeks then discontinue and start taking 2000 IU OTC daily. No further questions or concerns at this time.    Mireya Wright RN  [facility]

## 2017-12-27 NOTE — TELEPHONE ENCOUNTER
This writer attempted to contact patient on 12/27/17      Reason for call results and left message to return call.      Notes Recorded by Hattie Bourne MD on 12/27/2017 at 8:22 AM  Please call patient re:  Very low vitamin D.  I am sending the once weekly dose now.   See recommendations below:    Your vitamin D level is very low.  This can contribute to symptoms of fatigue, depression, and muscle pain.   I would recommend an increase in vitamin D supplement to 50,000  IU weekly for 8 weeks and then  2000 IU daily.   The 50,000 IU dosing prescription is sent to your pharmacy.  The 2000 IU dose can be obtained over the counter of as a prescription.  If you desire a prescription, please call the office and this will be sent to your pharmacy.   Recheck of this is needed in 4 months.  Please call or MyChart message me if you have any questions.  PSK           If patient calls back:   Lync RN  .    Laura Craig, RN   [facility]

## 2018-01-08 ENCOUNTER — OFFICE VISIT (OUTPATIENT)
Dept: NEUROSURGERY | Facility: CLINIC | Age: 38
End: 2018-01-08
Attending: NEUROLOGICAL SURGERY
Payer: COMMERCIAL

## 2018-01-08 VITALS
BODY MASS INDEX: 23.62 KG/M2 | DIASTOLIC BLOOD PRESSURE: 69 MMHG | WEIGHT: 165 LBS | TEMPERATURE: 100 F | OXYGEN SATURATION: 98 % | SYSTOLIC BLOOD PRESSURE: 107 MMHG | HEIGHT: 70 IN | HEART RATE: 77 BPM

## 2018-01-08 DIAGNOSIS — M54.12 CERVICAL RADICULOPATHY: Primary | ICD-10-CM

## 2018-01-08 PROCEDURE — 99244 OFF/OP CNSLTJ NEW/EST MOD 40: CPT | Performed by: NEUROLOGICAL SURGERY

## 2018-01-08 PROCEDURE — G0463 HOSPITAL OUTPT CLINIC VISIT: HCPCS | Performed by: NEUROLOGICAL SURGERY

## 2018-01-08 NOTE — MR AVS SNAPSHOT
After Visit Summary   1/8/2018    Adrian Kurtz    MRN: [id number]           Patient Information     Date Of Birth          1980        Visit Information        Provider Department      1/8/2018 3:20 PM Dewayne Squires MD [facility]        Care Instructions    Surgery scheduled at [facility] for C5-6 Arthroplasty    Pre-Operative:  -Surgical risks: blood clots in the leg or lung, problems urinating, nerve damage, drainage from the incision, infection, stiffness.  - Pre-operative physical with primary care physician within 30 days of surgical date.   -Stop all solid foods and liquids 8 hours before surgery.    -Shower procedure: Please shower with antibacterial soap the night before surgery and the morning of surgery. Refer to information sheet in folder.   - Discontinue Aspirin, NSAIDs (Advil, Ibuprofen, Naproxen, Nuprin, Diclofenac, Meloxicam, Aleve, Celebrex) x 7 days prior to surgical date. After surgery, do not begin taking these medications until given clearance. May cause bleeding and interfere with bone healing.  - May take Tylenol for pain.      Post-Operative:  -Same day procedure  - Post operative pain may require pain medications and muscle relaxants. You will receive medications upon discharge.  -Do NOT drive while taking narcotic pain medication.  -Post operative incision care- Watch for signs of infection: redness, swelling, warmth, drainage, and fever of 101 degrees or higher. Notify clinic [phone].  -Keep incision clean and dry. You may shower. No submerging incision in water such as pools, hot tubs, baths for at least 8 weeks or until incision is healed.   - Post operative activity limitations for 6 weeks after surgery: no lifting > 10 pounds, no bending, twisting, or overhead reaching. You will be re-evaluated at your follow up appointments.   -If a brace is required per Dr. Squires, Orthotics will fit you for the brace in the  "hospital.  -If you are currently employed, you will need to be off work for recovery and healing. Please fax any FMLA/short term disability paperwork to [phone]. You may call our clinic when you'd like to return to work and we can provide a work letter.   - Follow up appointments: 6 week post op, 3 months post op. You will need to an xray before each appointment. Please call to schedule these appointments at [phone].                Follow-ups after your visit        Who to contact     If you have questions or need follow up information about today's clinic visit or your schedule please contact [facility] directly at [phone].  Normal or non-critical lab and imaging results will be communicated to you by KargoCardhart, letter or phone within 4 business days after the clinic has received the results. If you do not hear from us within 7 days, please contact the clinic through KargoCardhart or phone. If you have a critical or abnormal lab result, we will notify you by phone as soon as possible.  Submit refill requests through HelloNature or call your pharmacy and they will forward the refill request to us. Please allow 3 business days for your refill to be completed.          Additional Information About Your Visit        HelloNature Information     HelloNature lets you send messages to your doctor, view your test results, renew your prescriptions, schedule appointments and more. To sign up, go to [email or web address] . Click on \"Log in\" on the left side of the screen, which will take you to the Welcome page. Then click on \"Sign up Now\" on the right side of the page.     You will be asked to enter the access code listed below, as well as some personal information. Please follow the directions to create your username and password.     Your access code is: [email or web address]  Expires: 3/22/2018 10:25 AM     Your access code will  in 90 days. If you need help or a new code, please call your [place] " "clinic or [phone].        Care EveryWhere ID     This is your Care EveryWhere ID. This could be used by other organizations to access your [place] medical records  [id number]        Your Vitals Were     Pulse Temperature Height Pulse Oximetry BMI (Body Mass Index)       77 100  F (37.8  C) (Oral) 5' 9.5\" (1.765 m) 98% 24.02 kg/m2        Blood Pressure from Last 3 Encounters:   01/08/18 107/69   12/22/17 100/60   08/11/17 116/70    Weight from Last 3 Encounters:   01/08/18 165 lb (74.8 kg)   12/22/17 160 lb (72.6 kg)   04/04/17 153 lb 6.4 oz (69.6 kg)              Today, you had the following     No orders found for display       Primary Care Provider Office Phone # Fax #    Charmaine Eliel Oliver [phone] [address] [address]        Equal Access to Services     : Hadii aad ku hadasho Soomaali, waaxda luqadaha, qaybta kaalmada adeegyada, waxay idiin hayfantan jose hughes . So [facility] [phone].    ATENCIÓN: Si habla español, tiene a goldberg disposición servicios gratuitos de asistencia lingüística. [facility] [phone].    We comply with applicable federal civil rights laws and [place] laws. We do not discriminate on the basis of race, color, national origin, age, disability, sex, sexual orientation, or gender identity.            Thank you!     Thank you for choosing [facility]  for your care. Our goal is always to provide you with excellent care. Hearing back from our patients is one way we can continue to improve our services. Please take a few minutes to complete the written survey that you may receive in the mail after your visit with us. Thank you!             Your Updated Medication List - Protect others around you: Learn how to safely use, store and throw away your medicines at www.disposemymeds.org.          This list is accurate as of: 1/8/18  3:49 PM.  Always use your most recent med list.                "    Brand Name Dispense Instructions for use Diagnosis    ALEVE 220 MG capsule   Generic drug:  naproxen sodium      Take 220 mg by mouth 2 times daily (with meals).        HYDROcodone-acetaminophen 5-325 MG per tablet    NORCO    60 tablet    1-2  At bedtime maximum 2 tablet(s) per day    Cervical radiculopathy       ibuprofen 200 MG capsule      Take 200 mg by mouth every 4 hours as needed.        varenicline 0.5 MG X 11 & 1 MG X 42 tablet    CHANTIX STARTING MONTH KATIE    53 tablet    Take 0.5 mg tab daily for 3 days, then 0.5 mg tab twice daily for 4 days, then 1 mg twice daily.    Tobacco use disorder       vitamin D 36199 UNIT capsule    ERGOCALCIFEROL    8 capsule    Take 1 capsule (50,000 Units) by mouth every 7 days for 8 doses    Vitamin D deficiency

## 2018-01-08 NOTE — NURSING NOTE
Patient Education    Education included but not limited to:  - Surgical risks: blood clots, urinating difficulties, nerve damage, infection.  - Pre-operative physical with primary care physician within 30 days of surgical date.   - Pre-operative clearance from other pertaining specialties.   - Discontinue NSAIDS x 7 days prior to surgical date.   -Do not begin taking NSAIDs (Advil, Motrin, Ibuprofen, Nuprin, Diclofenac, Meloxicam, Aleve, Celebrex, Aspirin, etc.) until 6 weeks after surgery if you had a fusion. May cause bleeding and interfere with bone healing.    -May try Tylenol for pain.  -Smoking cessation  -Discussed being off work after surgery, short term disability, FMLA, etc.   -Forms to be completed    -Pre-op timeline: NPO, shower, medications    -Hospital stay: Checking in, surgery, recovery room, hospital room.    - Post operative pain management: narcotics, muscle relaxants, ice, etc.   -No driving while taking narcotics     -Post operative incision care:   Keep your incision clean and dry.   Okay to shower. No submerging in water until incision healed.   Watch for signs of infection and notify clinic if drainage or fever develops.   - Post operative activity limitations: for the first 6 weeks we recommend no lifting > 10 pounds, no bending, twisting, or overhead reaching.  -If a brace is required per Dr. Squires, Orthotics will fit you for the brace in the hospital.  - Follow up appointments: 6 week post op, 3 months post op. You will need to an xray before each appointment. Please call to schedule follow up appointment at [phone].   - Education book was also given to the patient for further review.      Patient verbalized understanding of above instructions. All questions were answered to the best of my ability and the patient's satisfaction. Patient advised to call with any additional questions or concerns.

## 2018-01-08 NOTE — NURSING NOTE
"Adrian Kurtz is a 37 year old male who presents for:  Chief Complaint   Patient presents with     Neurologic Problem     referral from SUSHILA Ngo for cervical radiculopathy        Initial Vitals:  There were no vitals taken for this visit. Estimated body mass index is 23.29 kg/(m^2) as calculated from the following:    Height as of 12/22/17: 5' 9.5\" (1.765 m).    Weight as of 12/22/17: 160 lb (72.6 kg).. There is no height or weight on file to calculate BSA. BP completed using cuff size: regular  Data Unavailable    Do you feel safe in your environment?  Yes  Do you need any refills today? No    Nursing Comments: referral from SUSHILA Ngo for cervical radiculopathy.  Patient rates his pain today as 6 right arm to first 4 finger with weakness, numbness, and tingling      5 min. nursing intake time  Janiya Bello CMA, AAS      Discharge plan:   See providers dictation   2 min. nursing discharge time  Janiya Bello CMA, AAS       "
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
## 2018-01-08 NOTE — PATIENT INSTRUCTIONS
Surgery scheduled at [facility] for C5-6 Arthroplasty    Pre-Operative:  -Surgical risks: blood clots in the leg or lung, problems urinating, nerve damage, drainage from the incision, infection, stiffness.  - Pre-operative physical with primary care physician within 30 days of surgical date.   -Stop all solid foods and liquids 8 hours before surgery.    -Shower procedure: Please shower with antibacterial soap the night before surgery and the morning of surgery. Refer to information sheet in folder.   - Discontinue Aspirin, NSAIDs (Advil, Ibuprofen, Naproxen, Nuprin, Diclofenac, Meloxicam, Aleve, Celebrex) x 7 days prior to surgical date. After surgery, do not begin taking these medications until given clearance. May cause bleeding and interfere with bone healing.  - May take Tylenol for pain.      Post-Operative:  -Same day procedure  - Post operative pain may require pain medications and muscle relaxants. You will receive medications upon discharge.  -Do NOT drive while taking narcotic pain medication.  -Post operative incision care- Watch for signs of infection: redness, swelling, warmth, drainage, and fever of 101 degrees or higher. Notify clinic [phone].  -Keep incision clean and dry. You may shower. No submerging incision in water such as pools, hot tubs, baths for at least 8 weeks or until incision is healed.   - Post operative activity limitations for 6 weeks after surgery: no lifting > 10 pounds, no bending, twisting, or overhead reaching. You will be re-evaluated at your follow up appointments.   -If a brace is required per Dr. Squires, [facility] will fit you for the brace in the hospital.  -If you are currently employed, you will need to be off work for recovery and healing. Please fax any FMLA/short term disability paperwork to [phone]. You may call our clinic when you'd like to return to work and we can provide a work letter.   - Follow up appointments: 6 week post op, 3 months  post op. You will need to an xray before each appointment. Please call to schedule these appointments at [phone].

## 2018-01-09 ENCOUNTER — DOCUMENTATION ONLY (OUTPATIENT)
Dept: NEUROSURGERY | Facility: CLINIC | Age: 38
End: 2018-01-09

## 2018-01-09 NOTE — PROGRESS NOTES
1/9/2018    [employer] Forms: yes    Faxed to [phone]     Type of form Certification of ernesto care provider for employee's serious health condition (family and medical leave act)     Placed a copy in the bin and sent the original to medical records
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
## 2018-01-22 DIAGNOSIS — F17.200 TOBACCO USE DISORDER: ICD-10-CM

## 2018-01-22 DIAGNOSIS — M54.12 CERVICAL RADICULOPATHY: ICD-10-CM

## 2018-01-22 RX ORDER — HYDROCODONE BITARTRATE AND ACETAMINOPHEN 5; 325 MG/1; MG/1
TABLET ORAL
Qty: 40 TABLET | Refills: 0 | Status: ON HOLD | OUTPATIENT
Start: 2018-01-22 | End: 2018-02-06

## 2018-01-22 RX ORDER — VARENICLINE TARTRATE 1 MG/1
1 TABLET, FILM COATED ORAL 2 TIMES DAILY
Qty: 56 TABLET | Refills: 2 | Status: SHIPPED | OUTPATIENT
Start: 2018-01-22 | End: 2021-09-20

## 2018-01-22 NOTE — TELEPHONE ENCOUNTER
"Requested Prescriptions   Pending Prescriptions Disp Refills     HYDROcodone-acetaminophen (NORCO) 5-325 MG per tablet 60 tablet 0     Si-2  At bedtime maximum 2 tablet(s) per day    There is no refill protocol information for this order        varenicline (CHANTIX STARTING MONTH KATIE) 0.5 MG X 11 & 1 MG X 42 tablet 53 tablet 0     Sig: Take 0.5 mg tab daily for 3 days, then 0.5 mg tab twice daily for 4 days, then 1 mg twice daily.    Partial Cholinergic Nicotinic Agonist Agents Passed    2018  2:22 PM       Passed - Blood pressure less than 140/90    BP Readings from Last 3 Encounters:   18 107/69   17 100/60   17 116/70                Passed - Recent or future visit with authorizing provider's specialty    Patient had office visit in the last year or has a visit in the next 30 days with authorizing provider.  See \"Patient Info\" tab in inbasket, or \"Choose Columns\" in Meds & Orders section of the refill encounter.            Passed - Patient is 18 years of age or older        HYDROcodone-acetaminophen (NORCO) 5-325 MG per tablet      Last Written Prescription Date:  17  Last Fill Quantity: 60,   # refills: 0  Last Office Visit: 17  Future Office visit:    Next 5 appointments (look out 90 days)     2018  4:40 PM CST   [facility]    [address] [place] [id number] [phone]                   Routing refill request to provider for review/approval because:  Drug not on the [facility], [facility] or [facility] refill protocol or controlled substance    varenicline (CHANTIX STARTING MONTH ) 0.5 MG X 11 & 1 MG X 42 tablet  Last Written Prescription Date:  17  Last Fill Quantity: 53,  # refills: 0   Last Office Visit with [facility], P or  Health prescribing provider:  17   Future Office Visit:    Next 5 appointments (look out 90 days)     2018  4:40 PM CST "   Pre-Op physical with Charmaine Oliver MD   [facility] ([facility]    [address] [facility] [address] [phone]

## 2018-01-22 NOTE — TELEPHONE ENCOUNTER
Reason for Call:  Medication or medication refill:    Do you use a [facility]?  Name of the pharmacy and phone number for the current request:  [facility] [address] [facility] [address] [facility] AT [facility]    Name of the medication requested: Pending Prescriptions:                       Disp   Refills    HYDROcodone-acetaminophen (NORCO) 5-325 M*60 tab*0            Si-2  At bedtime maximum 2 tablet(s) per day    varenicline (CHANTIX STARTING MONTH ) *53 tab*0            Sig: Take 0.5 mg tab daily for 3 days, then 0.5 mg tab           twice daily for 4 days, then 1 mg twice daily.    Other request: Please call when prescription for narcotic is ready for pickup.    Can we leave a detailed message on this number? YES    Phone number patient can be reached at: Cell number on file:    Telephone Information:   Mobile [phone]       Best Time: any    Call taken on 2018 at 2:21 PM by Essence Navarro

## 2018-01-29 ENCOUNTER — OFFICE VISIT (OUTPATIENT)
Dept: FAMILY MEDICINE | Facility: CLINIC | Age: 38
End: 2018-01-29
Payer: COMMERCIAL

## 2018-01-29 VITALS
BODY MASS INDEX: 23.62 KG/M2 | WEIGHT: 165 LBS | HEIGHT: 70 IN | DIASTOLIC BLOOD PRESSURE: 60 MMHG | HEART RATE: 80 BPM | RESPIRATION RATE: 18 BRPM | SYSTOLIC BLOOD PRESSURE: 108 MMHG | TEMPERATURE: 98.6 F

## 2018-01-29 DIAGNOSIS — Z01.818 PREOP GENERAL PHYSICAL EXAM: Primary | ICD-10-CM

## 2018-01-29 LAB
ALBUMIN UR-MCNC: NEGATIVE MG/DL
APPEARANCE UR: CLEAR
BACTERIA #/AREA URNS HPF: ABNORMAL /HPF
BILIRUB UR QL STRIP: NEGATIVE
COLOR UR AUTO: YELLOW
ERYTHROCYTE [DISTWIDTH] IN BLOOD BY AUTOMATED COUNT: 13.4 % (ref 10–15)
GLUCOSE UR STRIP-MCNC: NEGATIVE MG/DL
HCT VFR BLD AUTO: 36.4 % (ref 40–53)
HGB BLD-MCNC: 12.3 G/DL (ref 13.3–17.7)
HGB UR QL STRIP: ABNORMAL
KETONES UR STRIP-MCNC: NEGATIVE MG/DL
LEUKOCYTE ESTERASE UR QL STRIP: ABNORMAL
MCH RBC QN AUTO: 30.8 PG (ref 26.5–33)
MCHC RBC AUTO-ENTMCNC: 33.8 G/DL (ref 31.5–36.5)
MCV RBC AUTO: 91 FL (ref 78–100)
MUCOUS THREADS #/AREA URNS LPF: PRESENT /LPF
NITRATE UR QL: NEGATIVE
NON-SQ EPI CELLS #/AREA URNS LPF: ABNORMAL /LPF
PH UR STRIP: 5.5 PH (ref 5–7)
PLATELET # BLD AUTO: 295 10E9/L (ref 150–450)
RBC # BLD AUTO: 3.99 10E12/L (ref 4.4–5.9)
RBC #/AREA URNS AUTO: ABNORMAL /HPF
SOURCE: ABNORMAL
SP GR UR STRIP: >1.03 (ref 1–1.03)
UROBILINOGEN UR STRIP-ACNC: 0.2 EU/DL (ref 0.2–1)
WBC # BLD AUTO: 9.6 10E9/L (ref 4–11)
WBC #/AREA URNS AUTO: ABNORMAL /HPF

## 2018-01-29 PROCEDURE — 36415 COLL VENOUS BLD VENIPUNCTURE: CPT | Performed by: FAMILY MEDICINE

## 2018-01-29 PROCEDURE — 81001 URINALYSIS AUTO W/SCOPE: CPT | Performed by: FAMILY MEDICINE

## 2018-01-29 PROCEDURE — 85027 COMPLETE CBC AUTOMATED: CPT | Performed by: FAMILY MEDICINE

## 2018-01-29 PROCEDURE — 99214 OFFICE O/P EST MOD 30 MIN: CPT | Performed by: FAMILY MEDICINE

## 2018-01-29 NOTE — PROGRESS NOTES
[address] [address]  [phone]  Dept: [phone]    PRE-OP EVALUATION:  Today's date: 2018    Adrian Kurtz (: 1980) presents for pre-operative evaluation assessment as requested by Dr. Squires.  He requires evaluation and anesthesia risk assessment prior to undergoing surgery/procedure for treatment of cervical DDD.  Proposed procedure: Spinal Fusion C5-6    Date of Surgery/ Procedure: Spine neck fusion   Time of Surgery/ Procedure:   Hospital/Surgical Facility: [facility]    Primary Physician: Charmaine Oliver  Type of Anesthesia Anticipated: General    Patient has a Health Care Directive or Living Will:  YES     1. NO - Do you have a history of heart attack, stroke, stent, bypass or surgery on an artery in the head, neck, heart or legs?  2. YES - Do you ever have any pain or discomfort in your chest?  3. NO - Do you have a history of  Heart Failure?  4. NO - Are you troubled by shortness of breath when: walking on the level, up a slight hill or at night?  5. NO - Do you currently have a cold, bronchitis or other respiratory infection?  6. NO - Do you have a cough, shortness of breath or wheezing?  7. YES - Do you sometimes get pains in the calves of your legs when you walk?  8. YES - Do you or anyone in your family have previous history of blood clots?  9. NO - Do you or does anyone in your family have a serious bleeding problem such as prolonged bleeding following surgeries or cuts?  10. NO - Have you ever had problems with anemia or been told to take iron pills?  11. NO - Have you had any abnormal blood loss such as black, tarry or bloody stools, or abnormal vaginal bleeding?  12. NO - Have you ever had a blood transfusion?  13. NO - Have you or any of your relatives ever had problems with anesthesia?  14. NO - Do you have sleep apnea, excessive snoring or daytime drowsiness?  15. NO - Do you have any prosthetic heart  valves?  16. NO - Do you have prosthetic joints?  17. NO - Is there any chance that you may be pregnant?      HPI:                                                      Brief HPI related to upcoming procedure:   Long-standing progressive degenerative cervical disc disease, refractory to conservative measures.  Planned fusion at C5-C6    See problem list for active medical problems.  Problems all longstanding and stable, except as noted/documented.  See ROS for pertinent symptoms related to these conditions.                                                                                                  .    MEDICAL HISTORY:                                                      Patient Active Problem List    Diagnosis Date Noted     Tobacco use disorder 04/04/2017     Priority: Medium     Cervical radiculopathy 05/27/2014     Priority: Medium     DDD (degenerative disc disease), cervical 05/27/2014     Priority: Medium     Hyperlipidemia with target LDL less than 130      Priority: Medium     Diagnosis updated by automated process. Provider to review and confirm.       AR (allergic rhinitis)      Priority: Medium     Eczema      Priority: Medium      Past Medical History:   Diagnosis Date     AR (allergic rhinitis)      Eczema      Hemorrhoids      Hyperlipidemia LDL goal < 130      Past Surgical History:   Procedure Laterality Date     OPEN REDUCTION INTERNAL FIXATION WRIST  6-04    Right wrist fracture with ORIF     Current Outpatient Prescriptions   Medication Sig Dispense Refill     HYDROcodone-acetaminophen (NORCO) 5-325 MG per tablet 1-2  At bedtime maximum 2 tablet(s) per day 40 tablet 0     varenicline (CHANTIX) 1 MG tablet Take 1 tablet (1 mg) by mouth 2 times daily 56 tablet 2     vitamin D (ERGOCALCIFEROL) 33942 UNIT capsule Take 1 capsule (50,000 Units) by mouth every 7 days for 8 doses 8 capsule 0     ibuprofen 200 MG capsule Take 200 mg by mouth every 4 hours as needed.       naproxen sodium (ALEVE) 220  "MG capsule Take 220 mg by mouth 2 times daily (with meals).       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       REVIEW OF SYSTEMS:                                                    C: NEGATIVE for fever, chills, change in weight  I: NEGATIVE for worrisome rashes, moles or lesions  E: NEGATIVE for vision changes or irritation  E/M: NEGATIVE for ear, mouth and throat problems  R: NEGATIVE for significant cough or SOB  B: NEGATIVE for masses, tenderness or discharge  CV: NEGATIVE for chest pain, palpitations or peripheral edema  GI: NEGATIVE for nausea, abdominal pain, heartburn, or change in bowel habits  : NEGATIVE for frequency, dysuria, or hematuria  MUSCULOSKELETAL: As above  N: NEGATIVE for weakness, dizziness or paresthesias  E: NEGATIVE for temperature intolerance, skin/hair changes  H: NEGATIVE for bleeding problems  P: NEGATIVE for changes in mood or affect    EXAM:                                                    /60 (BP Location: Right arm, Patient Position: Sitting, Cuff Size: Adult Regular)  Pulse 80  Temp 98.6  F (37  C) (Oral)  Resp 18  Ht 1.765 m (5' 9.5\")  Wt 74.8 kg (165 lb)  BMI 24.02 kg/m2    GENERAL APPEARANCE: healthy, alert and no distress     EYES: EOMI,  PERRL     HENT: ear canals and TM's normal and nose and mouth without ulcers or lesions     NECK: no adenopathy, no asymmetry, masses, or scars and thyroid normal to palpation     RESP: lungs clear to auscultation - no rales, " rhonchi or wheezes     CV: regular rates and rhythm, normal S1 S2, no S3 or S4 and no murmur, click or rub     ABDOMEN:  soft, nontender, no HSM or masses and bowel sounds normal     MS: extremities normal- no gross deformities noted, no evidence of inflammation in joints, FROM in all extremities.     SKIN: no suspicious lesions or rashes     NEURO: Normal strength and tone, sensory exam grossly normal, mentation intact and speech normal     PSYCH: mentation appears normal. and affect normal/bright     LYMPHATICS: No axillary, cervical, or supraclavicular nodes    DIAGNOSTICS:                                                    No EKG required  CBC, UA pending    Recent Labs   Lab Test  12/22/17   1033  01/11/13   1033  05/17/12   1539   HGB   --   12.5*  13.9   PLT   --   299  285   NA  138  143  139   POTASSIUM  4.5  4.3  4.1   CR  1.23  0.97  1.12        IMPRESSION:                                                    Reason for surgery/procedure: Degenerative cervical disc disease  Diagnosis/reason for consult: Preoperative clearance    The proposed surgical procedure is considered INTERMEDIATE risk.    REVISED CARDIAC RISK INDEX  The patient has the following serious cardiovascular risks for perioperative complications such as (MI, PE, VFib and 3  AV Block):  No serious cardiac risks  INTERPRETATION: 0 risks: Class I (very low risk - 0.4% complication rate)    The patient has the following additional risks for perioperative complications:  No identified additional risks      ICD-10-CM    1. Preop general physical exam Z01.818 CBC with platelets     *UA reflex to Microscopic and Culture ([place] [facility] (except [place] and [place])       RECOMMENDATIONS:                                                          --Patient is to take all scheduled medications on the day of surgery EXCEPT for modifications listed below.    Anticoagulant or Antiplatelet Medication Use  NSAIDS: Stop any anti-inflammatories  1 week prior to surgery        APPROVAL GIVEN to proceed with proposed procedure, without further diagnostic evaluation       Signed Electronically by: Charmaine Oliver MD    Copy of this evaluation report is provided to requesting physician.    [facility] Preop Guidelines

## 2018-01-29 NOTE — MR AVS SNAPSHOT
After Visit Summary   1/29/2018    Adrian Kurtz    MRN: [id number]           Patient Information     Date Of Birth          1980        Visit Information        Provider Department      1/29/2018 4:40 PM Charmaine Oliver MD [facility]        Today's Diagnoses     Preop general physical exam    -  1      Care Instructions      Before Your Surgery      Call your surgeon if there is any change in your health. This includes signs of a cold or flu (such as a sore throat, runny nose, cough, rash or fever).    Do not smoke, drink alcohol or take over the counter medicine (unless your surgeon or primary care doctor tells you to) for the 24 hours before and after surgery.    If you take prescribed drugs: Follow your doctor s orders about which medicines to take and which to stop until after surgery.    Eating and drinking prior to surgery: follow the instructions from your surgeon    Take a shower or bath the night before surgery. Use the soap your surgeon gave you to gently clean your skin. If you do not have soap from your surgeon, use your regular soap. Do not shave or scrub the surgery site.  Wear clean pajamas and have clean sheets on your bed.           Follow-ups after your visit        Your next 10 appointments already scheduled     Feb 06, 2018   Procedure with Dewayne Squires MD   [facility] (--)    [address]  [facility] [address] [phone]              Who to contact     If you have questions or need follow up information about today's clinic visit or your schedule please contact [facility] directly at [phone].  Normal or non-critical lab and imaging results will be communicated to you by MyChart, letter or phone within 4 business days after the clinic has received the results. If you do not hear from us within 7 days, please contact the clinic through MyChart or phone. If you have a critical or abnormal  "lab result, we will notify you by phone as soon as possible.  Submit refill requests through [employer] or call your pharmacy and they will forward the refill request to us. Please allow 3 business days for your refill to be completed.          Additional Information About Your Visit        MyChart Information     [employer] lets you send messages to your doctor, view your test results, renew your prescriptions, schedule appointments and more. To sign up, go to [email or web address] . Click on \"Log in\" on the left side of the screen, which will take you to the Welcome page. Then click on \"Sign up Now\" on the right side of the page.     You will be asked to enter the access code listed below, as well as some personal information. Please follow the directions to create your username and password.     Your access code is: [email or web address]  Expires: 3/22/2018 10:25 AM     Your access code will  in 90 days. If you need help or a new code, please call your [facility] clinic or [phone].        Care EveryWhere ID     This is your Care EveryWhere ID. This could be used by other organizations to access your [facility] medical records  [id number]        Your Vitals Were     Pulse Temperature Respirations Height BMI (Body Mass Index)       80 98.6  F (37  C) (Oral) 18 1.765 m (5' 9.5\") 24.02 kg/m2        Blood Pressure from Last 3 Encounters:   18 108/60   18 107/69   17 100/60    Weight from Last 3 Encounters:   18 74.8 kg (165 lb)   18 74.8 kg (165 lb)   17 72.6 kg (160 lb)              We Performed the Following     *UA reflex to Microscopic and Culture ([place] and [facility] (except [place] and [place])     CBC with platelets        Primary Care Provider Office Phone # Fax #    Charmaine Oliver [phone] [address]  [place] [address]        Equal Access to Services     [facility] LYNN AH: rica Hwang, " johnjacquelyncarmenza mjjossescott garciardeanimo chavezalejandro adonisin hayaan adeeg kharash la'aan ah. So [facility] [phone].    ATENCIÓN: Si chase lee, tiene a goldberg disposición servicios gratuitos de asistencia lingüística. Brittany al [phone].    We comply with applicable federal civil rights laws and [place] laws. We do not discriminate on the basis of race, color, national origin, age, disability, sex, sexual orientation, or gender identity.            Thank you!     Thank you for choosing [facility]  for your care. Our goal is always to provide you with excellent care. Hearing back from our patients is one way we can continue to improve our services. Please take a few minutes to complete the written survey that you may receive in the mail after your visit with us. Thank you!             Your Updated Medication List - Protect others around you: Learn how to safely use, store and throw away your medicines at www.disposemymeds.org.          This list is accurate as of 1/29/18  5:07 PM.  Always use your most recent med list.                   Brand Name Dispense Instructions for use Diagnosis    ALEVE 220 MG capsule   Generic drug:  naproxen sodium      Take 220 mg by mouth 2 times daily (with meals).        HYDROcodone-acetaminophen 5-325 MG per tablet    NORCO    40 tablet    1-2  At bedtime maximum 2 tablet(s) per day    Cervical radiculopathy       ibuprofen 200 MG capsule      Take 200 mg by mouth every 4 hours as needed.        varenicline 1 MG tablet    CHANTIX    56 tablet    Take 1 tablet (1 mg) by mouth 2 times daily    Tobacco use disorder       vitamin D 93560 UNIT capsule    ERGOCALCIFEROL    8 capsule    Take 1 capsule (50,000 Units) by mouth every 7 days for 8 doses    Vitamin D deficiency

## 2018-01-29 NOTE — NURSING NOTE
"Chief Complaint   Patient presents with     Pre-Op Exam       Initial /60 (BP Location: Right arm, Patient Position: Sitting, Cuff Size: Adult Regular)  Pulse 80  Temp 98.6  F (37  C) (Oral)  Resp 18  Ht 1.765 m (5' 9.5\")  Wt 74.8 kg (165 lb)  BMI 24.02 kg/m2 Estimated body mass index is 24.02 kg/(m^2) as calculated from the following:    Height as of this encounter: 1.765 m (5' 9.5\").    Weight as of this encounter: 74.8 kg (165 lb).  Medication Reconciliation: melquiades Uriostegui        "

## 2018-02-01 NOTE — H&P (VIEW-ONLY)
[address] [address]  [phone]  Dept: [phone]    PRE-OP EVALUATION:  Today's date: 2018    Adrian Kurtz (: 1980) presents for pre-operative evaluation assessment as requested by Dr. Squires.  He requires evaluation and anesthesia risk assessment prior to undergoing surgery/procedure for treatment of cervical DDD.  Proposed procedure: Spinal Fusion C5-6    Date of Surgery/ Procedure: Spine neck fusion   Time of Surgery/ Procedure:   Hospital/Surgical Facility: [facility]    Primary Physician: Charmaine Oliver  Type of Anesthesia Anticipated: General    Patient has a Health Care Directive or Living Will:  YES     1. NO - Do you have a history of heart attack, stroke, stent, bypass or surgery on an artery in the head, neck, heart or legs?  2. YES - Do you ever have any pain or discomfort in your chest?  3. NO - Do you have a history of  Heart Failure?  4. NO - Are you troubled by shortness of breath when: walking on the level, up a slight hill or at night?  5. NO - Do you currently have a cold, bronchitis or other respiratory infection?  6. NO - Do you have a cough, shortness of breath or wheezing?  7. YES - Do you sometimes get pains in the calves of your legs when you walk?  8. YES - Do you or anyone in your family have previous history of blood clots?  9. NO - Do you or does anyone in your family have a serious bleeding problem such as prolonged bleeding following surgeries or cuts?  10. NO - Have you ever had problems with anemia or been told to take iron pills?  11. NO - Have you had any abnormal blood loss such as black, tarry or bloody stools, or abnormal vaginal bleeding?  12. NO - Have you ever had a blood transfusion?  13. NO - Have you or any of your relatives ever had problems with anesthesia?  14. NO - Do you have sleep apnea, excessive snoring or daytime drowsiness?  15. NO - Do you have any prosthetic heart  valves?  16. NO - Do you have prosthetic joints?  17. NO - Is there any chance that you may be pregnant?      HPI:                                                      Brief HPI related to upcoming procedure:   Long-standing progressive degenerative cervical disc disease, refractory to conservative measures.  Planned fusion at C5-C6    See problem list for active medical problems.  Problems all longstanding and stable, except as noted/documented.  See ROS for pertinent symptoms related to these conditions.                                                                                                  .    MEDICAL HISTORY:                                                      Patient Active Problem List    Diagnosis Date Noted     Tobacco use disorder 04/04/2017     Priority: Medium     Cervical radiculopathy 05/27/2014     Priority: Medium     DDD (degenerative disc disease), cervical 05/27/2014     Priority: Medium     Hyperlipidemia with target LDL less than 130      Priority: Medium     Diagnosis updated by automated process. Provider to review and confirm.       AR (allergic rhinitis)      Priority: Medium     Eczema      Priority: Medium      Past Medical History:   Diagnosis Date     AR (allergic rhinitis)      Eczema      Hemorrhoids      Hyperlipidemia LDL goal < 130      Past Surgical History:   Procedure Laterality Date     OPEN REDUCTION INTERNAL FIXATION WRIST  6-04    Right wrist fracture with ORIF     Current Outpatient Prescriptions   Medication Sig Dispense Refill     HYDROcodone-acetaminophen (NORCO) 5-325 MG per tablet 1-2  At bedtime maximum 2 tablet(s) per day 40 tablet 0     varenicline (CHANTIX) 1 MG tablet Take 1 tablet (1 mg) by mouth 2 times daily 56 tablet 2     vitamin D (ERGOCALCIFEROL) 22742 UNIT capsule Take 1 capsule (50,000 Units) by mouth every 7 days for 8 doses 8 capsule 0     ibuprofen 200 MG capsule Take 200 mg by mouth every 4 hours as needed.       naproxen sodium (ALEVE) 220  "MG capsule Take 220 mg by mouth 2 times daily (with meals).       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       REVIEW OF SYSTEMS:                                                    C: NEGATIVE for fever, chills, change in weight  I: NEGATIVE for worrisome rashes, moles or lesions  E: NEGATIVE for vision changes or irritation  E/M: NEGATIVE for ear, mouth and throat problems  R: NEGATIVE for significant cough or SOB  B: NEGATIVE for masses, tenderness or discharge  CV: NEGATIVE for chest pain, palpitations or peripheral edema  GI: NEGATIVE for nausea, abdominal pain, heartburn, or change in bowel habits  : NEGATIVE for frequency, dysuria, or hematuria  MUSCULOSKELETAL: As above  N: NEGATIVE for weakness, dizziness or paresthesias  E: NEGATIVE for temperature intolerance, skin/hair changes  H: NEGATIVE for bleeding problems  P: NEGATIVE for changes in mood or affect    EXAM:                                                    /60 (BP Location: Right arm, Patient Position: Sitting, Cuff Size: Adult Regular)  Pulse 80  Temp 98.6  F (37  C) (Oral)  Resp 18  Ht 1.765 m (5' 9.5\")  Wt 74.8 kg (165 lb)  BMI 24.02 kg/m2    GENERAL APPEARANCE: healthy, alert and no distress     EYES: EOMI,  PERRL     HENT: ear canals and TM's normal and nose and mouth without ulcers or lesions     NECK: no adenopathy, no asymmetry, masses, or scars and thyroid normal to palpation     RESP: lungs clear to auscultation - no rales, " rhonchi or wheezes     CV: regular rates and rhythm, normal S1 S2, no S3 or S4 and no murmur, click or rub     ABDOMEN:  soft, nontender, no HSM or masses and bowel sounds normal     MS: extremities normal- no gross deformities noted, no evidence of inflammation in joints, FROM in all extremities.     SKIN: no suspicious lesions or rashes     NEURO: Normal strength and tone, sensory exam grossly normal, mentation intact and speech normal     PSYCH: mentation appears normal. and affect normal/bright     LYMPHATICS: No axillary, cervical, or supraclavicular nodes    DIAGNOSTICS:                                                    No EKG required  CBC, UA pending    Recent Labs   Lab Test  12/22/17   1033  01/11/13   1033  05/17/12   1539   HGB   --   12.5*  13.9   PLT   --   299  285   NA  138  143  139   POTASSIUM  4.5  4.3  4.1   CR  1.23  0.97  1.12        IMPRESSION:                                                    Reason for surgery/procedure: Degenerative cervical disc disease  Diagnosis/reason for consult: Preoperative clearance    The proposed surgical procedure is considered INTERMEDIATE risk.    REVISED CARDIAC RISK INDEX  The patient has the following serious cardiovascular risks for perioperative complications such as (MI, PE, VFib and 3  AV Block):  No serious cardiac risks  INTERPRETATION: 0 risks: Class I (very low risk - 0.4% complication rate)    The patient has the following additional risks for perioperative complications:  No identified additional risks      ICD-10-CM    1. Preop general physical exam Z01.818 CBC with platelets     *UA reflex to Microscopic and Culture ([place] and [facility] (except [place] and [place])       RECOMMENDATIONS:                                                          --Patient is to take all scheduled medications on the day of surgery EXCEPT for modifications listed below.    Anticoagulant or Antiplatelet Medication Use  NSAIDS: Stop any anti-inflammatories  1 week prior to surgery        APPROVAL GIVEN to proceed with proposed procedure, without further diagnostic evaluation       Signed Electronically by: Charmaine Oliver MD    Copy of this evaluation report is provided to requesting physician.    [facility] Preop Guidelines

## 2018-02-06 ENCOUNTER — ANESTHESIA (OUTPATIENT)
Dept: SURGERY | Facility: CLINIC | Age: 38
End: 2018-02-06
Payer: COMMERCIAL

## 2018-02-06 ENCOUNTER — HOSPITAL ENCOUNTER (OUTPATIENT)
Facility: CLINIC | Age: 38
Discharge: HOME | End: 2018-02-06
Attending: NEUROLOGICAL SURGERY | Admitting: NEUROLOGICAL SURGERY
Payer: COMMERCIAL

## 2018-02-06 ENCOUNTER — APPOINTMENT (OUTPATIENT)
Dept: GENERAL RADIOLOGY | Facility: CLINIC | Age: 38
End: 2018-02-06
Attending: NEUROLOGICAL SURGERY
Payer: COMMERCIAL

## 2018-02-06 ENCOUNTER — SURGERY (OUTPATIENT)
Age: 38
End: 2018-02-06

## 2018-02-06 ENCOUNTER — ANESTHESIA EVENT (OUTPATIENT)
Dept: SURGERY | Facility: CLINIC | Age: 38
End: 2018-02-06
Payer: COMMERCIAL

## 2018-02-06 VITALS
RESPIRATION RATE: 20 BRPM | SYSTOLIC BLOOD PRESSURE: 119 MMHG | HEIGHT: 71 IN | DIASTOLIC BLOOD PRESSURE: 77 MMHG | OXYGEN SATURATION: 100 % | WEIGHT: 157.6 LBS | TEMPERATURE: 98.1 F | BODY MASS INDEX: 22.06 KG/M2

## 2018-02-06 DIAGNOSIS — M54.12 CERVICAL RADICULOPATHY: ICD-10-CM

## 2018-02-06 DIAGNOSIS — Z98.890 S/P CERVICAL DISC REPLACEMENT: Primary | ICD-10-CM

## 2018-02-06 PROCEDURE — 36000071 ZZH SURGERY LEVEL 5 W FLUORO 1ST 30 MIN: Performed by: NEUROLOGICAL SURGERY

## 2018-02-06 PROCEDURE — 25000132 ZZH RX MED GY IP 250 OP 250 PS 637: Performed by: NEUROLOGICAL SURGERY

## 2018-02-06 PROCEDURE — 25000128 H RX IP 250 OP 636: Performed by: ANESTHESIOLOGY

## 2018-02-06 PROCEDURE — 25000128 H RX IP 250 OP 636: Performed by: NEUROLOGICAL SURGERY

## 2018-02-06 PROCEDURE — 25000566 ZZH SEVOFLURANE, EA 15 MIN: Performed by: NEUROLOGICAL SURGERY

## 2018-02-06 PROCEDURE — 25000128 H RX IP 250 OP 636: Performed by: NURSE ANESTHETIST, CERTIFIED REGISTERED

## 2018-02-06 PROCEDURE — C1713 ANCHOR/SCREW BN/BN,TIS/BN: HCPCS | Performed by: NEUROLOGICAL SURGERY

## 2018-02-06 PROCEDURE — 25000125 ZZHC RX 250: Performed by: NEUROLOGICAL SURGERY

## 2018-02-06 PROCEDURE — 22856 TOT DISC ARTHRP 1NTRSPC CRV: CPT | Performed by: NEUROLOGICAL SURGERY

## 2018-02-06 PROCEDURE — 37000008 ZZH ANESTHESIA TECHNICAL FEE, 1ST 30 MIN: Performed by: NEUROLOGICAL SURGERY

## 2018-02-06 PROCEDURE — 36000069 ZZH SURGERY LEVEL 5 EA 15 ADDTL MIN: Performed by: NEUROLOGICAL SURGERY

## 2018-02-06 PROCEDURE — 25000301 ZZH OR RX SURGIFLO W/THROMBIN KIT 2ML 1991 OPNP: Performed by: NEUROLOGICAL SURGERY

## 2018-02-06 PROCEDURE — 71000012 ZZH RECOVERY PHASE 1 LEVEL 1 FIRST HR: Performed by: NEUROLOGICAL SURGERY

## 2018-02-06 PROCEDURE — 25000125 ZZHC RX 250: Performed by: NURSE ANESTHETIST, CERTIFIED REGISTERED

## 2018-02-06 PROCEDURE — 71000013 ZZH RECOVERY PHASE 1 LEVEL 1 EA ADDTL HR: Performed by: NEUROLOGICAL SURGERY

## 2018-02-06 PROCEDURE — 40000277 XR SURGERY CARM FLUORO LESS THAN 5 MIN W STILLS

## 2018-02-06 PROCEDURE — 27210995 ZZH RX 272: Performed by: NEUROLOGICAL SURGERY

## 2018-02-06 PROCEDURE — 37000009 ZZH ANESTHESIA TECHNICAL FEE, EACH ADDTL 15 MIN: Performed by: NEUROLOGICAL SURGERY

## 2018-02-06 PROCEDURE — 22856 TOT DISC ARTHRP 1NTRSPC CRV: CPT | Mod: AS | Performed by: PHYSICIAN ASSISTANT

## 2018-02-06 PROCEDURE — 40000170 ZZH STATISTIC PRE-PROCEDURE ASSESSMENT II: Performed by: NEUROLOGICAL SURGERY

## 2018-02-06 PROCEDURE — 27210794 ZZH OR GENERAL SUPPLY STERILE: Performed by: NEUROLOGICAL SURGERY

## 2018-02-06 PROCEDURE — 25000128 H RX IP 250 OP 636: Performed by: FAMILY MEDICINE

## 2018-02-06 PROCEDURE — L8699 PROSTHETIC IMPLANT NOS: HCPCS | Performed by: NEUROLOGICAL SURGERY

## 2018-02-06 PROCEDURE — 25000128 H RX IP 250 OP 636: Performed by: PHYSICIAN ASSISTANT

## 2018-02-06 PROCEDURE — 71000027 ZZH RECOVERY PHASE 2 EACH 15 MINS: Performed by: NEUROLOGICAL SURGERY

## 2018-02-06 DEVICE — IMPLANTABLE DEVICE: Type: IMPLANTABLE DEVICE | Site: SPINE CERVICAL | Status: FUNCTIONAL

## 2018-02-06 RX ORDER — SODIUM CHLORIDE, SODIUM LACTATE, POTASSIUM CHLORIDE, CALCIUM CHLORIDE 600; 310; 30; 20 MG/100ML; MG/100ML; MG/100ML; MG/100ML
INJECTION, SOLUTION INTRAVENOUS CONTINUOUS
Status: DISCONTINUED | OUTPATIENT
Start: 2018-02-06 | End: 2018-02-06 | Stop reason: HOSPADM

## 2018-02-06 RX ORDER — ONDANSETRON 2 MG/ML
4 INJECTION INTRAMUSCULAR; INTRAVENOUS EVERY 30 MIN PRN
Status: DISCONTINUED | OUTPATIENT
Start: 2018-02-06 | End: 2018-02-06 | Stop reason: HOSPADM

## 2018-02-06 RX ORDER — LIDOCAINE HYDROCHLORIDE 20 MG/ML
INJECTION, SOLUTION INFILTRATION; PERINEURAL PRN
Status: DISCONTINUED | OUTPATIENT
Start: 2018-02-06 | End: 2018-02-06

## 2018-02-06 RX ORDER — SODIUM CHLORIDE, SODIUM LACTATE, POTASSIUM CHLORIDE, CALCIUM CHLORIDE 600; 310; 30; 20 MG/100ML; MG/100ML; MG/100ML; MG/100ML
INJECTION, SOLUTION INTRAVENOUS CONTINUOUS PRN
Status: DISCONTINUED | OUTPATIENT
Start: 2018-02-06 | End: 2018-02-06

## 2018-02-06 RX ORDER — PROPOFOL 10 MG/ML
INJECTION, EMULSION INTRAVENOUS CONTINUOUS PRN
Status: DISCONTINUED | OUTPATIENT
Start: 2018-02-06 | End: 2018-02-06

## 2018-02-06 RX ORDER — HYDROMORPHONE HYDROCHLORIDE 1 MG/ML
.3-.5 INJECTION, SOLUTION INTRAMUSCULAR; INTRAVENOUS; SUBCUTANEOUS EVERY 5 MIN PRN
Status: DISCONTINUED | OUTPATIENT
Start: 2018-02-06 | End: 2018-02-06 | Stop reason: HOSPADM

## 2018-02-06 RX ORDER — PROPOFOL 10 MG/ML
INJECTION, EMULSION INTRAVENOUS PRN
Status: DISCONTINUED | OUTPATIENT
Start: 2018-02-06 | End: 2018-02-06

## 2018-02-06 RX ORDER — FENTANYL CITRATE 0.05 MG/ML
25-50 INJECTION, SOLUTION INTRAMUSCULAR; INTRAVENOUS
Status: DISCONTINUED | OUTPATIENT
Start: 2018-02-06 | End: 2018-02-06 | Stop reason: HOSPADM

## 2018-02-06 RX ORDER — BUPIVACAINE HYDROCHLORIDE AND EPINEPHRINE 5; 5 MG/ML; UG/ML
INJECTION, SOLUTION PERINEURAL PRN
Status: DISCONTINUED | OUTPATIENT
Start: 2018-02-06 | End: 2018-02-06 | Stop reason: HOSPADM

## 2018-02-06 RX ORDER — ONDANSETRON 2 MG/ML
INJECTION INTRAMUSCULAR; INTRAVENOUS PRN
Status: DISCONTINUED | OUTPATIENT
Start: 2018-02-06 | End: 2018-02-06

## 2018-02-06 RX ORDER — CEFAZOLIN SODIUM 1 G
1 VIAL (EA) INJECTION SEE ADMIN INSTRUCTIONS
Status: DISCONTINUED | OUTPATIENT
Start: 2018-02-06 | End: 2018-02-06 | Stop reason: HOSPADM

## 2018-02-06 RX ORDER — NEOSTIGMINE METHYLSULFATE 1 MG/ML
VIAL (ML) INJECTION PRN
Status: DISCONTINUED | OUTPATIENT
Start: 2018-02-06 | End: 2018-02-06

## 2018-02-06 RX ORDER — FENTANYL CITRATE 50 UG/ML
INJECTION, SOLUTION INTRAMUSCULAR; INTRAVENOUS PRN
Status: DISCONTINUED | OUTPATIENT
Start: 2018-02-06 | End: 2018-02-06

## 2018-02-06 RX ORDER — GLYCOPYRROLATE 0.2 MG/ML
INJECTION, SOLUTION INTRAMUSCULAR; INTRAVENOUS PRN
Status: DISCONTINUED | OUTPATIENT
Start: 2018-02-06 | End: 2018-02-06

## 2018-02-06 RX ORDER — NALOXONE HYDROCHLORIDE 0.4 MG/ML
.1-.4 INJECTION, SOLUTION INTRAMUSCULAR; INTRAVENOUS; SUBCUTANEOUS
Status: DISCONTINUED | OUTPATIENT
Start: 2018-02-06 | End: 2018-02-06 | Stop reason: HOSPADM

## 2018-02-06 RX ORDER — CYCLOBENZAPRINE HCL 10 MG
10 TABLET ORAL 3 TIMES DAILY PRN
Qty: 60 TABLET | Refills: 1 | Status: SHIPPED | OUTPATIENT
Start: 2018-02-06 | End: 2018-02-16

## 2018-02-06 RX ORDER — HYDROCODONE BITARTRATE AND ACETAMINOPHEN 5; 325 MG/1; MG/1
1-2 TABLET ORAL EVERY 6 HOURS PRN
Qty: 60 TABLET | Refills: 0 | Status: SHIPPED | OUTPATIENT
Start: 2018-02-06 | End: 2018-02-16

## 2018-02-06 RX ORDER — HYDROCODONE BITARTRATE AND ACETAMINOPHEN 5; 325 MG/1; MG/1
1 TABLET ORAL ONCE
Status: COMPLETED | OUTPATIENT
Start: 2018-02-06 | End: 2018-02-06

## 2018-02-06 RX ORDER — ONDANSETRON 4 MG/1
4 TABLET, ORALLY DISINTEGRATING ORAL EVERY 30 MIN PRN
Status: DISCONTINUED | OUTPATIENT
Start: 2018-02-06 | End: 2018-02-06 | Stop reason: HOSPADM

## 2018-02-06 RX ADMIN — PROPOFOL 200 MG: 10 INJECTION, EMULSION INTRAVENOUS at 07:40

## 2018-02-06 RX ADMIN — FENTANYL CITRATE 50 MCG: 50 INJECTION, SOLUTION INTRAMUSCULAR; INTRAVENOUS at 10:27

## 2018-02-06 RX ADMIN — DEXMEDETOMIDINE HYDROCHLORIDE 4 MCG: 100 INJECTION, SOLUTION INTRAVENOUS at 09:30

## 2018-02-06 RX ADMIN — THROMBIN, TOPICAL (BOVINE) 10000 UNITS: KIT at 08:43

## 2018-02-06 RX ADMIN — PROPOFOL 100 MCG/KG/MIN: 10 INJECTION, EMULSION INTRAVENOUS at 07:40

## 2018-02-06 RX ADMIN — FENTANYL CITRATE 100 MCG: 50 INJECTION, SOLUTION INTRAMUSCULAR; INTRAVENOUS at 07:35

## 2018-02-06 RX ADMIN — FENTANYL CITRATE 50 MCG: 50 INJECTION, SOLUTION INTRAMUSCULAR; INTRAVENOUS at 10:13

## 2018-02-06 RX ADMIN — Medication 0.5 MG: at 10:35

## 2018-02-06 RX ADMIN — GENTAMICIN SULFATE 1000 ML: 40 INJECTION, SOLUTION INTRAMUSCULAR; INTRAVENOUS at 08:43

## 2018-02-06 RX ADMIN — LIDOCAINE HYDROCHLORIDE 80 MG: 20 INJECTION, SOLUTION INFILTRATION; PERINEURAL at 08:03

## 2018-02-06 RX ADMIN — DEXMEDETOMIDINE HYDROCHLORIDE 8 MCG: 100 INJECTION, SOLUTION INTRAVENOUS at 09:37

## 2018-02-06 RX ADMIN — SODIUM CHLORIDE, POTASSIUM CHLORIDE, SODIUM LACTATE AND CALCIUM CHLORIDE: 600; 310; 30; 20 INJECTION, SOLUTION INTRAVENOUS at 07:36

## 2018-02-06 RX ADMIN — GLYCOPYRROLATE 0.5 MG: 0.2 INJECTION, SOLUTION INTRAMUSCULAR; INTRAVENOUS at 09:23

## 2018-02-06 RX ADMIN — HYDROCODONE BITARTRATE AND ACETAMINOPHEN 1 TABLET: 5; 325 TABLET ORAL at 11:33

## 2018-02-06 RX ADMIN — FENTANYL CITRATE 50 MCG: 50 INJECTION, SOLUTION INTRAMUSCULAR; INTRAVENOUS at 09:31

## 2018-02-06 RX ADMIN — MIDAZOLAM 2 MG: 1 INJECTION INTRAMUSCULAR; INTRAVENOUS at 07:38

## 2018-02-06 RX ADMIN — FENTANYL CITRATE 50 MCG: 50 INJECTION, SOLUTION INTRAMUSCULAR; INTRAVENOUS at 07:40

## 2018-02-06 RX ADMIN — ONDANSETRON 4 MG: 2 INJECTION INTRAMUSCULAR; INTRAVENOUS at 09:11

## 2018-02-06 RX ADMIN — DEXAMETHASONE SODIUM PHOSPHATE 10 MG: 10 INJECTION, SOLUTION INTRAMUSCULAR; INTRAVENOUS at 07:50

## 2018-02-06 RX ADMIN — ROCURONIUM BROMIDE 50 MG: 10 INJECTION INTRAVENOUS at 08:03

## 2018-02-06 RX ADMIN — Medication 0.5 MG: at 10:53

## 2018-02-06 RX ADMIN — Medication 0.5 MG: at 10:20

## 2018-02-06 RX ADMIN — Medication 0.5 MG: at 11:06

## 2018-02-06 RX ADMIN — FENTANYL CITRATE 100 MCG: 50 INJECTION, SOLUTION INTRAMUSCULAR; INTRAVENOUS at 08:17

## 2018-02-06 RX ADMIN — DEXMEDETOMIDINE HYDROCHLORIDE 8 MCG: 100 INJECTION, SOLUTION INTRAVENOUS at 08:44

## 2018-02-06 RX ADMIN — NEOSTIGMINE METHYLSULFATE 3.5 MG: 1 INJECTION INTRAMUSCULAR; INTRAVENOUS; SUBCUTANEOUS at 09:23

## 2018-02-06 RX ADMIN — CEFAZOLIN SODIUM 2 G: 2 SOLUTION INTRAVENOUS at 08:00

## 2018-02-06 RX ADMIN — BUPIVACAINE HYDROCHLORIDE AND EPINEPHRINE BITARTRATE 10 ML: 5; .005 INJECTION, SOLUTION PERINEURAL at 08:43

## 2018-02-06 ASSESSMENT — VISUAL ACUITY
OU: NORMAL ACUITY

## 2018-02-06 ASSESSMENT — ENCOUNTER SYMPTOMS: SEIZURES: 0

## 2018-02-06 ASSESSMENT — COPD QUESTIONNAIRES: COPD: 0

## 2018-02-06 ASSESSMENT — LIFESTYLE VARIABLES: TOBACCO_USE: 1

## 2018-02-06 NOTE — IP AVS SNAPSHOT
MRN:[id number]                      After Visit Summary   2/6/2018    Adrian Kurtz    MRN: [id number]           Thank you!     Thank you for choosing [facility] for your care. Our goal is always to provide you with excellent care. Hearing back from our patients is one way we can continue to improve our services. Please take a few minutes to complete the written survey that you may receive in the mail after you visit with us. Thank you!        Patient Information     Date Of Birth          1980        About your hospital stay     You were admitted on:  February 6, 2018 You last received care in the:  [facility] PACU    You were discharged on:  February 6, 2018        Reason for your hospital stay       You were in the hospital for cervical disc replacement.                  Who to Call     For medical emergencies, please call 911.  For non-urgent questions about your medical care, please call your primary care provider or clinic, [phone]  For questions related to your surgery, please call your surgery clinic        Attending Provider     Provider Dewayne Lujan MD Neurosurgery       Primary Care Provider Office Phone # Fax #    Charmaine Eliel Oliver [phone]      After Care Instructions     Activity       Discharge instructions:  No lifting of more than 10 pounds, no bending, no twisting until follow up visit.    Ok to shampoo hair, shower or bathe, but, do not scrub or submerge incision under water until evaluated post-operatively in clinic.    Ok to walk as tolerated, avoid bed rest and prolonged sitting.    No contact sports until after follow up visit  No high impact activities such as; running/jogging, snowmobile or 4 rodriguez riding or any other recreational vehicles.    Do not take NSAIDS (ibuprofen, advil, aleve, naproxen, etc) for pain control.    Do NOT drive while taking narcotic pain medication.    Call the [facility] at  [phone] for increasing redness, swelling or pus draining from the incision, increased pain or any other questions and concerns.            Diet       Follow this diet upon discharge: Pre admission diet            Wound care and dressings       Keep your incision clean and dry at all times.  OK to remove dressing on postop day 2.  OK to shower on postop day 3 and allow water to run over incision, pat dry after shower.  No bathing swimming or submerging in water.  Call immediately or come to ED if any drainage occurs, or you develop new pain, redness, or swelling.                  Follow-up Appointments     Follow-up and recommended labs and tests        Please follow up at the [facility] in 6 weeks with xray prior.  Please call the clinic at [phone] to schedule your appointment with Luca Wadsworth PA-C or Georgie Josue PA-C.                  Future tests that were ordered for you     XR Cervical Spine 2/3 Views                 Further instructions from your care team         Same Day Surgery Discharge Instructions for  Sedation and General Anesthesia       It's not unusual to feel dizzy, light-headed or faint for up to 24 hours after surgery or while taking pain medication.  If you have these symptoms: sit for a few minutes before standing and have someone assist you when you get up to walk or use the bathroom.      You should rest and relax for the next 24 hours. We recommend you make arrangements to have an adult stay with you for at least 24 hours after your discharge.  Avoid hazardous and strenuous activity.      DO NOT DRIVE any vehicle or operate mechanical equipment for 24 hours following the end of your surgery.  Even though you may feel normal, your reactions may be affected by the medication you have received.      Do not drink alcoholic beverages for 24 hours following surgery.       Slowly progress to your regular diet as you feel able. It's not unusual to feel nauseated and/or vomit  after receiving anesthesia.  If you develop these symptoms, drink clear liquids (apple juice, ginger ale, broth, 7-up, etc. ) until you feel better.  If your nausea and vomiting persists for 24 hours, please notify your surgeon.        All narcotic pain medications, along with inactivity and anesthesia, can cause constipation. Drinking plenty of liquids and increasing fiber intake will help.      For any questions of a medical nature, call your surgeon.      Do not make important decisions for 24 hours.      If you had general anesthesia, you may have a sore throat for a couple of days related to the breathing tube used during surgery.  You may use Cepacol lozenges to help with this discomfort.  If it worsens or if you develop a fever, contact your surgeon.       If you feel your pain is not well managed with the pain medications prescribed by your surgeon, please contact your surgeon's office to let them know so they can address your concerns.     [facility] at [facility]  Dr. Squires Discharge Instructions Following Spine Surgery  [phone]  Monday - Friday; 8:00 AM - 4:00 PM    In General:   After you have had surgery on your spine, remember do not twist, or excessively flex or extend the area that you had surgery.  These activities can prevent healing.  Pain is normal and to be expected following surgery.  Please call our office to schedule your appointment follow up appointment.      Bowel Care:  Many people have constipation (hard stools) after surgery.  To help prevent constipation: Drink plenty of fluid (8-10 glasses/day); Eat more fiber, such as whole grain bread, bran cereal, and fruits and vegetables; Stay active by walking; Over the counter stool softener may also help.      Medications:  Spine surgery and pain management is unique to all patients.  You will generally be given medications for pain, muscle spasms or tightness, and for constipation during the immediate post op  period.  It is important that you use these as prescribed.  Please remember to bring your pill bottles to all of your appointments. Avoid alcoholic beverages while taking narcotic pain medications. You can use ice to areas of pain as needed, 20 minutes at a time.  Changing positions and walking will help loosen your muscles as well.    Driving:  No driving while on narcotic pain medications.  It is state law not to drive while under the influence of a drug to a degree which renders you incapable of safely driving.  The narcotic medication you will be taking after surgery falls under this category.     Activity:   After surgery, most people feel less pain than they have had in a long time.  Walking and light activities will help you regain the use of your muscles.  You are encouraged to walk: start with short walks 5-10 minutes at a time for 4-5 times per day and increase as tolerated.  Stair climbing as tolerated, we recommend you use the railing. No lifting greater than 10 pounds: approximately equal to one gallon of milk. No twisting, bending in the area you have had surgery. No housework, vacuuming, laundry, leaf raking, lawn mowing, or snow removal. Wear your brace (if ordered) as directed.    Showers:  If you have sutures or staples you may shower two days after surgery. It is ok to let water run over your incision but do not touch or scrub on the incision. Pat dry immediately after showering. If there is a dressing in place, you may remove it 2 days after surgery. If you were closed with Derma oshea (glue), you may shower without covering the incision. No baths, hot tubs, or pool activity for at least 6 weeks.     Nutrition:  In general, your diet restrictions will not change with your surgery.  You may need to eat small frequent meals initially until your appetite returns.  Eat plenty of high fiber foods and drink plenty of fluids. If you do not have a fluid restriction from or prior to surgery, we recommend  "6-8 (8oz) glasses of water per day. Other fluids are fine, but water is best. Nausea is not uncommon; it is a common side effect to many pain medications.  We recommend that you take the pain medications with food, if this does not improve your symptoms, please call us.     Smoking:  For proper healing it is required that you quit using all tobacco products.  This includes smoking, chewing, nicotine gums, and nicotine patches.  Call Dr. Squires if these occur: Drainage from your incision, increased pain/redness/swelling, temperatures greater than 101.5, increased leg pain or swelling or unrelieved headaches    Go to the nearest Emergency Room if you experience: chest pain, shortness of breath, neck swelling or swallowing problems                Pending Results     Date and Time Order Name Status Description    2/6/2018 0934 XR Surgery ONEL Fluoro L/T 5 Min w Stills In process             Statement of Approval     Ordered          02/06/18 0950  I have reviewed and agree with all the recommendations and orders detailed in this document.  EFFECTIVE NOW     Approved and electronically signed by:  Georgie Josue PA-C             Admission Information     Date & Time Provider Department Dept. Phone    2/6/2018 Dewayne Squires MD [facility] PACU [phone]      Your Vitals Were     Blood Pressure Temperature Respirations Height Weight Pulse Oximetry    136/72 98.1  F (36.7  C) 18 1.791 m (5' 10.5\") 71.5 kg (157 lb 9.6 oz) 100%    BMI (Body Mass Index)                   22.29 kg/m2           [employer] Information     [email or web address] lets you send messages to your doctor, view your test results, renew your prescriptions, schedule appointments and more. To sign up, go to [email or web address] Click on \"Log in\" on the left side of the screen, which will take you to the Welcome page. Then click on \"Sign up Now\" on the right side of the page.     You will be asked to enter the access code listed below, as well as some " personal information. Please follow the directions to create your username and password.     Your access code is: [email or web address]  Expires: 3/22/2018 10:25 AM     Your access code will  in 90 days. If you need help or a new code, please call your [place] clinic or [phone].        Care EveryWhere ID     This is your Care EveryWhere ID. This could be used by other organizations to access your [place] medical records  [id number]        Equal Access to Services     AURELIO BAILEY : Hadmendel medrano hadasho Soomaali, waaxda luqadaha, qaybta kaalmada adeegyada, idania hughes . So [facility] [phone].    ATENCIÓN: Si habla español, tiene a goldberg disposición servicios gratuitos de asistencia lingüística. Llame al [phone].    We comply with applicable federal civil rights laws and [place] laws. We do not discriminate on the basis of race, color, national origin, age, disability, sex, sexual orientation, or gender identity.               Review of your medicines      START taking        Dose / Directions    cyclobenzaprine 10 MG tablet   Commonly known as:  FLEXERIL   Used for:  S/P cervical disc replacement        Dose:  10 mg   Take 1 tablet (10 mg) by mouth 3 times daily as needed for muscle spasms   Quantity:  60 tablet   Refills:  1         CONTINUE these medicines which may have CHANGED, or have new prescriptions. If we are uncertain of the size of tablets/capsules you have at home, strength may be listed as something that might have changed.        Dose / Directions    HYDROcodone-acetaminophen 5-325 MG per tablet   Commonly known as:  NORCO   This may have changed:    - how much to take  - how to take this  - when to take this  - reasons to take this  - additional instructions   Used for:  S/P cervical disc replacement   Notes to Patient:  1 tab at 1:33 AM        Dose:  1-2 tablet   Take 1-2 tablets by mouth every 6 hours as needed for moderate to severe pain   Quantity:  60 tablet    Refills:  0         CONTINUE these medicines which have NOT CHANGED        Dose / Directions    varenicline 1 MG tablet   Commonly known as:  CHANTIX   Used for:  Tobacco use disorder        Dose:  1 mg   Take 1 tablet (1 mg) by mouth 2 times daily   Quantity:  56 tablet   Refills:  2       vitamin D 47929 UNIT capsule   Commonly known as:  ERGOCALCIFEROL   Used for:  Vitamin D deficiency        Dose:  90696 Units   Take 1 capsule (50,000 Units) by mouth every 7 days for 8 doses   Quantity:  8 capsule   Refills:  0         STOP taking     ALEVE 220 MG capsule   Generic drug:  naproxen sodium           ibuprofen 200 MG capsule                Where to get your medicines      These medications were sent to [facility] [place], [place] - [address]  [address], [place] [address]     Phone:  [phone]     cyclobenzaprine 10 MG tablet         Some of these will need a paper prescription and others can be bought over the counter. Ask your nurse if you have questions.     Bring a paper prescription for each of these medications     HYDROcodone-acetaminophen 5-325 MG per tablet                Protect others around you: Learn how to safely use, store and throw away your medicines at www.disposemymeds.org.        Information about OPIOIDS     PRESCRIPTION OPIOIDS: WHAT YOU NEED TO KNOW    Prescription opioids can be used to help relieve moderate to severe pain and are often prescribed following a surgery or injury, or for certain health conditions. These medications can be an important part of treatment but also come with serious risks. It is important to work with your health care provider to make sure you are getting the safest, most effective care.    WHAT ARE THE RISKS AND SIDE EFFECTS OF OPIOID USE?  Prescription opioids carry serious risks of addiction and overdose, especially with prolonged use. An opioid overdose, often marked by slowed breathing can cause sudden death. The use of  prescription opioids can have a number of side effects as well, even when taken as directed:      Tolerance - meaning you might need to take more of a medication for the same pain relief    Physical dependence - meaning you have symptoms of withdrawal when a medication is stopped    Increased sensitivity to pain    Constipation    Nausea, vomiting, and dry mouth    Sleepiness and dizziness    Confusion    Depression    Low levels of testosterone that can result in lower sex drive, energy, and strength    Itching and sweating    RISKS ARE GREATER WITH:    History of drug misuse, substance use disorder, or overdose    Mental health conditions (such as depression or anxiety)    Sleep apnea    Older age (65 years or older)    Pregnancy    Avoid alcohol while taking prescription opioids.   Also, unless specifically advised by your health care provider, medications to avoid include:    Benzodiazepines (such as Xanax or Valium)    Muscle relaxants (such as Soma or Flexeril)    Hypnotics (such as Ambien or Lunesta)    Other prescription opioids    KNOW YOUR OPTIONS:  Talk to your health care provider about ways to manage your pain that do not involve prescription opioids. Some of these options may actually work better and have fewer risks and side effects:    Pain relievers such as acetaminophen, ibuprofen, and naproxen    Some medications that are also used for depression or seizures    Physical therapy and exercise    Cognitive behavioral therapy, a psychological, goal-directed approach, in which patients learn how to modify physical, behavioral, and emotional triggers of pain and stress    IF YOU ARE PRESCRIBED OPIOIDS FOR PAIN:    Never take opioids in greater amounts or more often than prescribed    Follow up with your primary health care provider and work together to create a plan on how to manage your pain.    Talk about ways to help manage your pain that do not involve prescription opioids    Talk about all concerns  and side effects    Help prevent misuse and abuse    Never sell or share prescription opioids    Never use another person's prescription opioids    Store prescription opioids in a secure place and out of reach of others (this may include visitors, children, friends, and family)    Visit www.cdc.gov/drugoverdose to learn about risks of opioid abuse and overdose    If you believe you may be struggling with addiction, tell your health care provider and ask for guidance or call [facility]'s National Helpline at 7-614-732-HELP    LEARN MORE / www.cdc.gov/drugoverdose/prescribing/guideline.html    Safely dispose of unused prescription opioids: Find your local drug take-back programs and more information about the importance of safe disposal at www.doseofreality.[place].gov             Medication List: This is a list of all your medications and when to take them. Check marks below indicate your daily home schedule. Keep this list as a reference.      Medications           Morning Afternoon Evening Bedtime As Needed    cyclobenzaprine 10 MG tablet   Commonly known as:  FLEXERIL   Take 1 tablet (10 mg) by mouth 3 times daily as needed for muscle spasms                                HYDROcodone-acetaminophen 5-325 MG per tablet   Commonly known as:  NORCO   Take 1-2 tablets by mouth every 6 hours as needed for moderate to severe pain   Last time this was given:  1 tablet on 2/6/2018 11:33 AM   Notes to Patient:  1 tab at 1:33 AM                                varenicline 1 MG tablet   Commonly known as:  CHANTIX   Take 1 tablet (1 mg) by mouth 2 times daily                                vitamin D 62348 UNIT capsule   Commonly known as:  ERGOCALCIFEROL   Take 1 capsule (50,000 Units) by mouth every 7 days for 8 doses

## 2018-02-06 NOTE — IP AVS SNAPSHOT
[place] [address]    [place] [address]    Phone:  [phone]                                       After Visit Summary   2/6/2018    Adrian Kurtz    MRN: [id number]           After Visit Summary Signature Page     I have received my discharge instructions, and my questions have been answered. I have discussed any challenges I see with this plan with the nurse or doctor.    ..........................................................................................................................................  Patient/Patient Representative Signature      ..........................................................................................................................................  Patient Representative Print Name and Relationship to Patient    ..................................................               ................................................  Date                                            Time    ..........................................................................................................................................  Reviewed by Signature/Title    ...................................................              ..............................................  Date                                                            Time

## 2018-02-06 NOTE — ANESTHESIA POSTPROCEDURE EVALUATION
Patient: Adrian Kurtz    Procedure(s):  C5-6 ARTHROPLASTY  - Wound Class: I-Clean    Diagnosis:CERVICAL RADICULOPATHY  Diagnosis Additional Information: No value filed.    Anesthesia Type:  General, ETT    Note:  Anesthesia Post Evaluation    Patient location during evaluation: PACU  Patient participation: Able to fully participate in evaluation  Level of consciousness: responsive to verbal stimuli and sleepy but conscious  Pain management: adequate  Airway patency: patent  Cardiovascular status: acceptable  Respiratory status: acceptable  Hydration status: acceptable  PONV: none     Anesthetic complications: None          Last vitals:  Vitals:    02/06/18 1115 02/06/18 1130 02/06/18 1249   BP: 114/90 136/72 119/77   Resp: 18 18 20   Temp:      SpO2: 100% 100% 100%         Electronically Signed By: Mitch Uriostegui MD  February 6, 2018  1:43 PM

## 2018-02-06 NOTE — BRIEF OP NOTE
[facility]   Brief Operative Note    Pre-operative diagnosis: CERVICAL RADICULOPATHY   Post-operative diagnosis Same   Procedure: Procedure(s):  C5-6 ARTHROPLASTY  - Wound Class: I-Clean   Surgeon(s): Surgeon(s) and Role:     * Dewayne Squires MD - Primary     * Georgie Joseu PA-C - Assisting   Estimated blood loss: 20 mL    Specimens: * No specimens in log *   Findings: See op note

## 2018-02-06 NOTE — DISCHARGE INSTRUCTIONS
Same Day Surgery Discharge Instructions for  Sedation and General Anesthesia       It's not unusual to feel dizzy, light-headed or faint for up to 24 hours after surgery or while taking pain medication.  If you have these symptoms: sit for a few minutes before standing and have someone assist you when you get up to walk or use the bathroom.      You should rest and relax for the next 24 hours. We recommend you make arrangements to have an adult stay with you for at least 24 hours after your discharge.  Avoid hazardous and strenuous activity.      DO NOT DRIVE any vehicle or operate mechanical equipment for 24 hours following the end of your surgery.  Even though you may feel normal, your reactions may be affected by the medication you have received.      Do not drink alcoholic beverages for 24 hours following surgery.       Slowly progress to your regular diet as you feel able. It's not unusual to feel nauseated and/or vomit after receiving anesthesia.  If you develop these symptoms, drink clear liquids (apple juice, ginger ale, broth, 7-up, etc. ) until you feel better.  If your nausea and vomiting persists for 24 hours, please notify your surgeon.        All narcotic pain medications, along with inactivity and anesthesia, can cause constipation. Drinking plenty of liquids and increasing fiber intake will help.      For any questions of a medical nature, call your surgeon.      Do not make important decisions for 24 hours.      If you had general anesthesia, you may have a sore throat for a couple of days related to the breathing tube used during surgery.  You may use Cepacol lozenges to help with this discomfort.  If it worsens or if you develop a fever, contact your surgeon.       If you feel your pain is not well managed with the pain medications prescribed by your surgeon, please contact your surgeon's office to let them know so they can address your concerns.     [facility]  [facility]  Dr. Squires Discharge Instructions Following Spine Surgery  [phone]  Monday - Friday; 8:00 AM - 4:00 PM    In General:   After you have had surgery on your spine, remember do not twist, or excessively flex or extend the area that you had surgery.  These activities can prevent healing.  Pain is normal and to be expected following surgery.  Please call our office to schedule your appointment follow up appointment.      Bowel Care:  Many people have constipation (hard stools) after surgery.  To help prevent constipation: Drink plenty of fluid (8-10 glasses/day); Eat more fiber, such as whole grain bread, bran cereal, and fruits and vegetables; Stay active by walking; Over the counter stool softener may also help.      Medications:  Spine surgery and pain management is unique to all patients.  You will generally be given medications for pain, muscle spasms or tightness, and for constipation during the immediate post op period.  It is important that you use these as prescribed.  Please remember to bring your pill bottles to all of your appointments. Avoid alcoholic beverages while taking narcotic pain medications. You can use ice to areas of pain as needed, 20 minutes at a time.  Changing positions and walking will help loosen your muscles as well.    Driving:  No driving while on narcotic pain medications.  It is state law not to drive while under the influence of a drug to a degree which renders you incapable of safely driving.  The narcotic medication you will be taking after surgery falls under this category.     Activity:   After surgery, most people feel less pain than they have had in a long time.  Walking and light activities will help you regain the use of your muscles.  You are encouraged to walk: start with short walks 5-10 minutes at a time for 4-5 times per day and increase as tolerated.  Stair climbing as tolerated, we recommend you use the railing. No lifting greater than 10 pounds:  approximately equal to one gallon of milk. No twisting, bending in the area you have had surgery. No housework, vacuuming, laundry, leaf raking, lawn mowing, or snow removal. Wear your brace (if ordered) as directed.    Showers:  If you have sutures or staples you may shower two days after surgery. It is ok to let water run over your incision but do not touch or scrub on the incision. Pat dry immediately after showering. If there is a dressing in place, you may remove it 2 days after surgery. If you were closed with Derma oshea (glue), you may shower without covering the incision. No baths, hot tubs, or pool activity for at least 6 weeks.     Nutrition:  In general, your diet restrictions will not change with your surgery.  You may need to eat small frequent meals initially until your appetite returns.  Eat plenty of high fiber foods and drink plenty of fluids. If you do not have a fluid restriction from or prior to surgery, we recommend 6-8 (8oz) glasses of water per day. Other fluids are fine, but water is best. Nausea is not uncommon; it is a common side effect to many pain medications.  We recommend that you take the pain medications with food, if this does not improve your symptoms, please call us.     Smoking:  For proper healing it is required that you quit using all tobacco products.  This includes smoking, chewing, nicotine gums, and nicotine patches.  Call Dr. Squires if these occur: Drainage from your incision, increased pain/redness/swelling, temperatures greater than 101.5, increased leg pain or swelling or unrelieved headaches    Go to the nearest Emergency Room if you experience: chest pain, shortness of breath, neck swelling or swallowing problems

## 2018-02-06 NOTE — ANESTHESIA PREPROCEDURE EVALUATION
Procedure: Procedure(s):  REPLACE DISK CERVICAL ANTERIOR  Preop diagnosis: CERVICAL RADICULOPATHY    Allergies   Allergen Reactions     Percocet [Oxycodone-Acetaminophen] Nausea and Vomiting and Hives     Past Medical History:   Diagnosis Date     AR (allergic rhinitis)      Eczema      Hemorrhoids      Hyperlipidemia LDL goal < 130      Past Surgical History:   Procedure Laterality Date     OPEN REDUCTION INTERNAL FIXATION WRIST  6-04    Right wrist fracture with ORIF     Social History   Substance Use Topics     Smoking status: Current Every Day Smoker     Packs/day: 1.00     Years: 12.00     Types: Cigarettes, Cigars     Smokeless tobacco: Never Used     Alcohol use 10.0 oz/week     20 Standard drinks or equivalent per week     Prior to Admission medications    Medication Sig Start Date End Date Taking? Authorizing Provider   vitamin D (ERGOCALCIFEROL) 48751 UNIT capsule Take 1 capsule (50,000 Units) by mouth every 7 days for 8 doses 12/27/17 2/15/18 Yes Hattie Bourne MD   HYDROcodone-acetaminophen (NORCO) 5-325 MG per tablet 1-2  At bedtime maximum 2 tablet(s) per day 1/22/18   Charmaine Oliver MD   varenicline (CHANTIX) 1 MG tablet Take 1 tablet (1 mg) by mouth 2 times daily 1/22/18   Charmaine Oliver MD   ibuprofen 200 MG capsule Take 200 mg by mouth every 4 hours as needed.    Reported, Patient   naproxen sodium (ALEVE) 220 MG capsule Take 220 mg by mouth 2 times daily (with meals).    Reported, Patient     Current Facility-Administered Medications Ordered in Epic   Medication Dose Route Frequency Last Rate Last Dose     ceFAZolin (ANCEF) intermittent infusion 2 g in 50 mL dextrose PREMIX  2 g Intravenous Pre-Op/Pre-procedure x 1 dose         ceFAZolin (ANCEF) 1g in 10 mL SWFI premix for IVP  1 g Intravenous See Admin Instructions         dexamethasone (DECADRON) injection 10 mg  10 mg INTRA-ARTICULAR Q6H         No current [facility]-ordered outpatient prescriptions on file.       Wt Readings  from Last 1 Encounters:   01/29/18 74.8 kg (165 lb)     Temp Readings from Last 1 Encounters:   01/29/18 37  C (98.6  F) (Oral)     BP Readings from Last 6 Encounters:   01/29/18 108/60   01/08/18 107/69   12/22/17 100/60   08/11/17 116/70   06/23/17 108/77   04/04/17 126/80     Pulse Readings from Last 4 Encounters:   01/29/18 80   01/08/18 77   12/22/17 86   08/11/17 77     Resp Readings from Last 1 Encounters:   01/29/18 18     SpO2 Readings from Last 1 Encounters:   01/08/18 98%     Recent Labs   Lab Test  12/22/17   1033  01/11/13   1033   NA  138  143   POTASSIUM  4.5  4.3   CHLORIDE  104  102   CO2  27  30   ANIONGAP  7  12   GLC  93  98   BUN  6*  6   CR  1.23  0.97   ELOISE  9.4  9.3     Recent Labs   Lab Test  12/22/17   1033  01/11/13   1033   AST  18  40   ALT  23  87*   ALKPHOS  50  69   BILITOTAL  0.9  0.5   LIPASE   --   40     Recent Labs   Lab Test  01/29/18   1702  01/11/13   1033   WBC  9.6  9.4   HGB  12.3*  12.5*   PLT  295  299         Anesthesia Evaluation     .             ROS/MED HX    ENT/Pulmonary:     (+)allergic rhinitis, tobacco use, Current use , . .   (-) asthma, COPD and sleep apnea   Neurologic: Comment: Cervical radiculopathy     (-) seizures and CVA   Cardiovascular:     (+) Dyslipidemia, ----. : . . . :. .       METS/Exercise Tolerance:     Hematologic:         Musculoskeletal:         GI/Hepatic:        (-) GERD and liver disease   Renal/Genitourinary:      (-) renal disease   Endo:         Psychiatric:         Infectious Disease:         Malignancy:         Other:                     Physical Exam  Normal systems: dental    Airway   Mallampati: I  TM distance: >3 FB  Neck ROM: full    Dental     Cardiovascular   Rhythm and rate: regular      Pulmonary    breath sounds clear to auscultation                    Anesthesia Plan      History & Physical Review  History and physical reviewed and following examination; no interval change.    ASA Status:  1 .    NPO Status:  > 8  hours    Plan for General and ETT   PONV prophylaxis:  Ondansetron (or other 5HT-3) and Dexamethasone or Solumedrol  Additional equipment: Videolaryngoscope Propofol gtt       Postoperative Care      Consents  Anesthetic plan, risks, benefits and alternatives discussed with:  Patient..                          .

## 2018-02-06 NOTE — OR NURSING
Dr. Squires at bedside.  He took his neck collar off and stated the patient did not need to wear it.

## 2018-02-06 NOTE — ANESTHESIA CARE TRANSFER NOTE
Patient: Adrian Kurtz    Procedure(s):  C5-6 ARTHROPLASTY  - Wound Class: I-Clean    Diagnosis: CERVICAL RADICULOPATHY  Diagnosis Additional Information: No value filed.    Anesthesia Type:   General, ETT     Note:  Airway :Face Mask  Patient transferred to:PACU  Comments: Suctioned, spont resp ,lifts head  > 5 sec. Extubated with immediate exchange, to PACU, report to RN.Handoff Report: Identifed the Patient, Identified the Reponsible Provider, Reviewed the pertinent medical history, Discussed the surgical course, Reviewed Intra-OP anesthesia mangement and issues during anesthesia, Set expectations for post-procedure period and Allowed opportunity for questions and acknowledgement of understanding      Vitals: (Last set prior to Anesthesia Care Transfer)    CRNA VITALS  2/6/2018 0914 - 2/6/2018 0952      2/6/2018             Resp Rate (observed): 9                Electronically Signed By: ROSARIO Hummel CRNA  February 6, 2018  9:52 AM

## 2018-02-06 NOTE — OP NOTE
DATE:  2/6/18  SURGEON:  Dewayne Squires MD       ASSISTANT:  SUSHILA Ochoa   Note: Georgie Josue was present for and assisted with the entire surgery and her role as an assistant was crucial for aid in positioning, exposure, suctioning, retraction and closure.       PREOPERATIVE DIAGNOSIS:  Cervical radiculopathy.       POSTOPERATIVE DIAGNOSIS:  Cervical radiculopathy.       PROCEDURES:   1.  Cervical 5-6 anterior diskectomy and bilateral foraminal decompression.   2.  Cervical 5-6 preparation of disk space for arthroplasty.   3.  Cervical 5-6 insertion of LDR Mobi-C artificial disk.   4.  Use of intraoperative microscope and fluoroscopy.       ESTIMATED BLOOD LOSS:  25 mL.       INDICATIONS FOR PROCEDURE:  37-year-old male with intractable right arm pain and weakness.  MRI showed a right sided C5-6 disk herniation and foraminal stenosis.  Risks, benefits, indications and alternatives were discussed with the patient and family in detail, all of their questions answered, and they wished to proceed with surgery.       DESCRIPTION OF PROCEDURE:  The patient was positioned supine.  Our sterile prepping and draping procedures were performed.  Antibiotics were administered and timeout was performed.  The right-sided horizontal neck incision was planned.  The 10 blade was used to open the incision and the monopolar was used to come down on the platysma and divide the platysma.  The Metzenbaum scissors used to create a plane in the investing fascia medial to the sternocleidomastoid muscle.  Blunt dissection was used to come down upon the anterior cervical spine.  The spinal needle was used to verify the correct level.  The monopolar was used to elevate the longus colli the Trimline retractor was inserted.  [place] pins were inserted.  The #15 blade was used to perform an annulotomy, and a complete diskectomy was performed at C5-6 with a combination of curets, pituitary rongeurs and Kerrison rongeurs.  The nerve hook used to work  under the posterior longitudinal ligament, the posterior longitudinal ligament was removed with the Kerrison rongeurs, and the bilateral foramina were decompressed with the Kerrison rongeurs.  Hemostasis was achieved with Surgiflo.  The LDR Mobi-C graft was inserted into the disk space.  Fluoroscopy demonstrated excellent positioning.  Antibiotic irrigation was performed, hemostasis was achieved.  The platysma and dermal layer were closed with 3-0 Vicryl sutures and the skin was closed with a running subcuticular stitch.

## 2018-02-15 ENCOUNTER — NURSE TRIAGE (OUTPATIENT)
Dept: NURSING | Facility: CLINIC | Age: 38
End: 2018-02-15

## 2018-02-15 ENCOUNTER — TELEPHONE (OUTPATIENT)
Dept: FAMILY MEDICINE | Facility: CLINIC | Age: 38
End: 2018-02-15

## 2018-02-15 DIAGNOSIS — Z98.890 S/P CERVICAL DISC REPLACEMENT: ICD-10-CM

## 2018-02-15 NOTE — TELEPHONE ENCOUNTER
Adrian called asking for refills for Norco and Flexaril.  He also wants to make an appt to see Dr Oliver.  Please call Adrian regarding the refills.  He will pick them both up if they are approved. He does not use the  Pharmacy in [place].  I transferred Adrian to [facility].  Routed: P [id number] BA Dr Benito IRWIN RN [place] [facility]

## 2018-02-15 NOTE — TELEPHONE ENCOUNTER
Adrian called asking for refills for Norco and Flexaril.  He also wants to make an appt to see Dr Oliver.  Please call Adrian regarding the refills.  He will pick them both up if they are approved. He does not use the  Pharmacy in [place].  I transferred Adrian to [facility].  Routed: P [id number] BA Dr Benito IRWIN RN [place] [facility]

## 2018-02-16 ENCOUNTER — TELEPHONE (OUTPATIENT)
Dept: NEUROSURGERY | Facility: CLINIC | Age: 38
End: 2018-02-16

## 2018-02-16 DIAGNOSIS — Z98.890 S/P CERVICAL DISC REPLACEMENT: Primary | ICD-10-CM

## 2018-02-16 RX ORDER — CYCLOBENZAPRINE HCL 10 MG
10 TABLET ORAL 3 TIMES DAILY PRN
Qty: 60 TABLET | Refills: 0 | Status: SHIPPED | OUTPATIENT
Start: 2018-02-16 | End: 2018-03-14

## 2018-02-16 RX ORDER — METHYLPREDNISOLONE 4 MG
TABLET, DOSE PACK ORAL
Qty: 21 TABLET | Refills: 0 | Status: SHIPPED | OUTPATIENT
Start: 2018-02-16 | End: 2021-09-20

## 2018-02-16 RX ORDER — HYDROCODONE BITARTRATE AND ACETAMINOPHEN 5; 325 MG/1; MG/1
1-2 TABLET ORAL EVERY 6 HOURS PRN
Qty: 60 TABLET | Refills: 0 | Status: SHIPPED | OUTPATIENT
Start: 2018-02-16 | End: 2018-03-14

## 2018-02-16 NOTE — TELEPHONE ENCOUNTER
Contacted patient for post-op follow up call. Patient is s/p C5-6 arthroplasty on 2/6/18. After surgery patient is doing well overall. Reports neck incisional pain and throat irritation. No difficulty breathing or swallowing. He's currently taking norco and robaxin as needed. Will also prescribe medrol dosepak and encourage patient to apply ice to help with inflammation/throat irritation.    Patient also reports some constipation from the narcotics. Advised him to increase fluid/fiber intake and try OTC stool softener. No issues or concerns with incision. Reminded patient to watch for s/sx of infection, use proper incision care, and follow activity restrictions. Patient will call to schedule 6 week post op f/u appt.  All questions were answered to the best of my ability and the patient's satisfaction. Patient verbalized understanding and advised to call with any additional questions or concerns.

## 2018-02-22 NOTE — TELEPHONE ENCOUNTER
Reason for Call:  Other prescription    Detailed comments:     Phone Number Patient can be reached at: Home number on file [phone] (home)    Best Time: Patient is in [place] and asking if there was a way for him to be able to get the prescriptions for the pain and muscle relaxer?    Can we leave a detailed message on this number? YES    Call taken on 2/22/2018 at 11:36 AM by Mae Bianchi

## 2018-02-23 NOTE — TELEPHONE ENCOUNTER
Spoke with pt and informed him of message below.    Pt would like Muscle Relaxer faxed to pharmacy in [place]    Faxed rx to [phone]    [place] at front for /Wife vannesa will       Deepa Uriostegui MA

## 2018-03-14 DIAGNOSIS — Z98.890 S/P CERVICAL DISC REPLACEMENT: ICD-10-CM

## 2018-03-14 RX ORDER — CYCLOBENZAPRINE HCL 10 MG
10 TABLET ORAL 3 TIMES DAILY PRN
Qty: 60 TABLET | Refills: 1 | Status: SHIPPED | OUTPATIENT
Start: 2018-03-14 | End: 2018-03-29

## 2018-03-14 RX ORDER — HYDROCODONE BITARTRATE AND ACETAMINOPHEN 5; 325 MG/1; MG/1
1-2 TABLET ORAL EVERY 8 HOURS PRN
Qty: 50 TABLET | Refills: 0 | Status: SHIPPED | OUTPATIENT
Start: 2018-03-14 | End: 2021-09-20

## 2018-03-21 ENCOUNTER — HOSPITAL ENCOUNTER (OUTPATIENT)
Dept: GENERAL RADIOLOGY | Facility: CLINIC | Age: 38
Discharge: HOME | End: 2018-03-21
Attending: PHYSICIAN ASSISTANT | Admitting: PHYSICIAN ASSISTANT
Payer: COMMERCIAL

## 2018-03-21 ENCOUNTER — OFFICE VISIT (OUTPATIENT)
Dept: NEUROSURGERY | Facility: CLINIC | Age: 38
End: 2018-03-21
Attending: PHYSICIAN ASSISTANT
Payer: COMMERCIAL

## 2018-03-21 VITALS
TEMPERATURE: 97.7 F | HEART RATE: 108 BPM | SYSTOLIC BLOOD PRESSURE: 120 MMHG | DIASTOLIC BLOOD PRESSURE: 74 MMHG | OXYGEN SATURATION: 98 %

## 2018-03-21 DIAGNOSIS — Z98.1 HX OF CERVICAL SPINAL ARTHRODESIS: ICD-10-CM

## 2018-03-21 DIAGNOSIS — Z98.1 HX OF CERVICAL SPINAL ARTHRODESIS: Primary | ICD-10-CM

## 2018-03-21 PROCEDURE — G0463 HOSPITAL OUTPT CLINIC VISIT: HCPCS | Performed by: PHYSICIAN ASSISTANT

## 2018-03-21 PROCEDURE — 99024 POSTOP FOLLOW-UP VISIT: CPT | Performed by: PHYSICIAN ASSISTANT

## 2018-03-21 PROCEDURE — 72040 X-RAY EXAM NECK SPINE 2-3 VW: CPT

## 2018-03-21 NOTE — PATIENT INSTRUCTIONS
- followup in 6 weeks with xray prior     - Call the clinic at [phone] for increased pain or any other questions and concerns.

## 2018-03-21 NOTE — MR AVS SNAPSHOT
"              After Visit Summary   3/21/2018    Adrian Kurtz    MRN: [id number]           Patient Information     Date Of Birth          1980        Visit Information        Provider Department      3/21/2018 11:20 AM Georgie Josue PA-C [facility]        Care Instructions    - followup in 6 weeks with xray prior     - Call the clinic at [phone] for increased pain or any other questions and concerns.          Follow-ups after your visit        Your next 10 appointments already scheduled     May 07, 2018  9:00 AM CDT   Return Visit with Dewayne Squires MD   [facility] ([facility])    [address] [place] [id number]   [phone]              Who to contact     If you have questions or need follow up information about today's clinic visit or your schedule please contact [facility] directly at [phone].  Normal or non-critical lab and imaging results will be communicated to you by iCrossinghart, letter or phone within 4 business days after the clinic has received the results. If you do not hear from us within 7 days, please contact the clinic through iCrossinghart or phone. If you have a critical or abnormal lab result, we will notify you by phone as soon as possible.  Submit refill requests through [employer] or call your pharmacy and they will forward the refill request to us. Please allow 3 business days for your refill to be completed.          Additional Information About Your Visit        iCrossinghart Information     [employer] lets you send messages to your doctor, view your test results, renew your prescriptions, schedule appointments and more. To sign up, go to [email or web address] . Click on \"Log in\" on the left side of the screen, which will take you to the Welcome page. Then click on \"Sign up Now\" on the right side of the page.     You will be asked to enter the access code " listed below, as well as some personal information. Please follow the directions to create your username and password.     Your access code is: [email or web address]  Expires: 3/22/2018 11:25 AM     Your access code will  in 90 days. If you need help or a new code, please call your [facility] clinic or [phone].        Care EveryWhere ID     This is your Care EveryWhere ID. This could be used by other organizations to access your [facility] medical records  [id number]        Your Vitals Were     Pulse Temperature Pulse Oximetry             108 97.7  F (36.5  C) (Oral) 98%          Blood Pressure from Last 3 Encounters:   18 120/74   18 119/77   18 108/60    Weight from Last 3 Encounters:   18 157 lb 9.6 oz (71.5 kg)   18 165 lb (74.8 kg)   18 165 lb (74.8 kg)              Today, you had the following     No orders found for display       Primary Care Provider Office Phone # Fax #    Charmaine Eliel Oliver [phone] [address] [facility] [address]        Equal Access to Services     AURELIO BAILEY : Hadii freddy ku hadasho Soomaali, waaxda luqadaha, qaybta kaalmada adeegyada, idania reyesn jose franklin. So [facility] [phone].    ATENCIÓN: Si habla español, tiene a goldberg disposición servicios gratuitos de asistencia lingüística. Llame al [phone].    We comply with applicable federal civil rights laws and [place] laws. We do not discriminate on the basis of race, color, national origin, age, disability, sex, sexual orientation, or gender identity.            Thank you!     Thank you for choosing [facility]  for your care. Our goal is always to provide you with excellent care. Hearing back from our patients is one way we can continue to improve our services. Please take a few minutes to complete the written survey that you may receive in the mail after your visit with us. Thank you!             Your Updated  Medication List - Protect others around you: Learn how to safely use, store and throw away your medicines at www.disposemymeds.org.          This list is accurate as of 3/21/18 11:36 AM.  Always use your most recent med list.                   Brand Name Dispense Instructions for use Diagnosis    cyclobenzaprine 10 MG tablet    FLEXERIL    60 tablet    Take 1 tablet (10 mg) by mouth 3 times daily as needed for muscle spasms    S/P cervical disc replacement       HYDROcodone-acetaminophen 5-325 MG per tablet    NORCO    50 tablet    Take 1-2 tablets by mouth every 8 hours as needed for moderate to severe pain    S/P cervical disc replacement       methylPREDNISolone 4 MG tablet    MEDROL DOSEPAK    21 tablet    Follow package instructions    S/P cervical disc replacement       varenicline 1 MG tablet    CHANTIX    56 tablet    Take 1 tablet (1 mg) by mouth 2 times daily    Tobacco use disorder

## 2018-03-21 NOTE — NURSING NOTE
"Adrian Kurtz is a 37 year old male who presents for:  Chief Complaint   Patient presents with     Neurologic Problem     s/p cervical disc replacement  2-6-18        Initial Vitals:  There were no vitals taken for this visit. Estimated body mass index is 22.29 kg/(m^2) as calculated from the following:    Height as of 2/6/18: 5' 10.5\" (1.791 m).    Weight as of 2/6/18: 157 lb 9.6 oz (71.5 kg).. There is no height or weight on file to calculate BSA. BP completed using cuff size: regular  Data Unavailable    Do you feel safe in your environment?  Yes  Do you need any refills today? No    Nursing Comments: s/p cervical disc replacement  2-6-18.  Patient rates his pain today as 0        Janiya Bello CMA, AAS      Discharge plan:   See providers dictation   Janiya Bello CMA, AAS       "

## 2018-03-21 NOTE — LETTER
3/21/2018         RE: Adrian Kurtz  [address]  [place] [address]        Dear Colleague,    Thank you for referring your patient, Adrian Kurtz, to the [facility] NEUROSURGERY CLINIC. Please see a copy of my visit note below.    [facility]  Neurosurgery followup:    HPI: 6 weeks s/p C5-6 arthroplasty. Doing very well post operatively. States he has some mild stiffness in his neck, but he is otherwise feeling well. Right arm pain resolved. He has plans to start a new job on Monday, so he no longer needs a work letter. Denies radicular pain or weakness.    Exam:  Constitutional:  Alert, well nourished, NAD.  HEENT: Normocephalic, atraumatic.   Pulm:  Without shortness of breath   CV:  No pitting edema of BLE.     Neurological:  Awake  Alert  Oriented x 3  Motor exam:     Shoulder Abduction:  Right:  5/5    Left:  5/5  Biceps:                      Right:  5/5    Left:  5/5  Triceps:                     Right:  5/5    Left:  5/5  Wrist Extensors:       Right:  5/5    Left:  5/5  Wrist Flexors:           Right:  5/5    Left:  5/5  Intrinsics:                  Right:  5/5    Left:  5/5     Able to spontaneously move U/E bilaterally  Sensation intact throughout all U/E dermatomes  Incision: Healing nicely.  Imaging: AP and lateral cervical films reveal stable hardware.  A/P: 6 weeks s/p C5-6 arthroplasty. Doing well post operatively. Right arm pain resolved. XRs reveal stable hardware. Incision healing nicely. Discussed that neck stiffness should continue to improve with time. Advised to call if he needs letter for work. Will have him followup in 6 weeks with xray prior. Patient voiced understanding and agreement.    - Call the clinic at [phone] for increased pain or any other questions and concerns.        Georgie Josue PA-C  [facility]  [address]  [address]  [place] [address]    Tel [phone]  Pager [phone]      Again, thank  you for allowing me to participate in the care of your patient.        Sincerely,        Georgie Josue PA-C

## 2018-03-21 NOTE — PROGRESS NOTES
[facility]  Neurosurgery followup:    HPI: 6 weeks s/p C5-6 arthroplasty. Doing very well post operatively. States he has some mild stiffness in his neck, but he is otherwise feeling well. Right arm pain resolved. He has plans to start a new job on Monday, so he no longer needs a work letter. Denies radicular pain or weakness.    Exam:  Constitutional:  Alert, well nourished, NAD.  HEENT: Normocephalic, atraumatic.   Pulm:  Without shortness of breath   CV:  No pitting edema of BLE.     Neurological:  Awake  Alert  Oriented x 3  Motor exam:     Shoulder Abduction:  Right:  5/5    Left:  5/5  Biceps:                      Right:  5/5    Left:  5/5  Triceps:                     Right:  5/5    Left:  5/5  Wrist Extensors:       Right:  5/5    Left:  5/5  Wrist Flexors:           Right:  5/5    Left:  5/5  Intrinsics:                  Right:  5/5    Left:  5/5     Able to spontaneously move U/E bilaterally  Sensation intact throughout all U/E dermatomes  Incision: Healing nicely.  Imaging: AP and lateral cervical films reveal stable hardware.  A/P: 6 weeks s/p C5-6 arthroplasty. Doing well post operatively. Right arm pain resolved. XRs reveal stable hardware. Incision healing nicely. Discussed that neck stiffness should continue to improve with time. Advised to call if he needs letter for work. Will have him followup in 6 weeks with xray prior. Patient voiced understanding and agreement.    - Call the clinic at [phone] for increased pain or any other questions and concerns.        Georgie Josue PA-C  [facility]  [address]  [address] [place] [address]    Tel [phone]  Pager [phone]

## 2018-03-29 ENCOUNTER — TELEPHONE (OUTPATIENT)
Dept: FAMILY MEDICINE | Facility: CLINIC | Age: 38
End: 2018-03-29

## 2018-03-29 DIAGNOSIS — Z98.890 S/P CERVICAL DISC REPLACEMENT: ICD-10-CM

## 2018-03-29 RX ORDER — CYCLOBENZAPRINE HCL 10 MG
10 TABLET ORAL 3 TIMES DAILY PRN
Qty: 60 TABLET | Refills: 0 | Status: SHIPPED | OUTPATIENT
Start: 2018-03-29 | End: 2021-09-20

## 2018-03-29 RX ORDER — HYDROCODONE BITARTRATE AND ACETAMINOPHEN 5; 325 MG/1; MG/1
1-2 TABLET ORAL EVERY 8 HOURS PRN
Qty: 50 TABLET | Refills: 0 | OUTPATIENT
Start: 2018-03-29

## 2018-03-29 NOTE — TELEPHONE ENCOUNTER
This writer attempted to contact pt on 03/29/18      Reason for call LM and left detailed message.      If patient calls back:   Inform pt to contact Dr. Josue for refill on hydrocodone.        Khloe Barrera MA

## 2018-03-29 NOTE — TELEPHONE ENCOUNTER
Reason for Call:  Medication or medication refill:    Do you use a [facility]?  Name of the pharmacy and phone number for the current request:     [id number] - [facility] [address] [facility]      Name of the medication requested:      Disp Refills Start End VISHAL   HYDROcodone-acetaminophen (NORCO) 5-325 MG per tablet          cyclobenzaprine (FLEXERIL) 10 MG tablet    Other request: pt needs refil for of these rx's. Pt is out. Please advise    Can we leave a detailed message on this number? YES    Phone number patient can be reached at: Home number on file [phone] (home)    Best Time: any    Call taken on 3/29/2018 at 9:43 AM by Cirilo Givens

## 2018-03-29 NOTE — TELEPHONE ENCOUNTER
No chronic pain on problem list.  Just filled 3/15/18- prescription from [facility].  Please ask patient to contact that provider for refill of norco.  Prescription for flexeril granted

## 2018-05-09 ENCOUNTER — TELEPHONE (OUTPATIENT)
Dept: NEUROSURGERY | Facility: CLINIC | Age: 38
End: 2018-05-09

## 2018-06-15 NOTE — TELEPHONE ENCOUNTER
Contacted patient for post op follow up. He is s/p C5-6 arthroplasty on 2/6/18 with Dr. Squires.      Patient was scheduled for 3 month follow up on 5/7/18 with Dr. Squires, but he did not show for this appt. Contacted patient again today to discuss recovery and follow up. LVM, awaiting call back.   
Contacted patient for post op follow up. He is s/p C5-6 arthroplasty on 2/6/18 with Dr. Squires. He saw Georgie Josue PA-C in clinic on 3/21/18 and was doing well post operatively, right arm pain resolved.     Patient was scheduled for 3 month follow up on 5/7/18 with Dr. Squires, but he did not show for this appt. Contacted patient today to discuss recovery and follow up. LVM, awaiting call back.   
Yes

## 2019-08-16 ENCOUNTER — OFFICE VISIT (OUTPATIENT)
Dept: NEUROSURGERY | Facility: CLINIC | Age: 39
End: 2019-08-16
Attending: PHYSICIAN ASSISTANT
Payer: COMMERCIAL

## 2019-08-16 ENCOUNTER — ANCILLARY PROCEDURE (OUTPATIENT)
Dept: GENERAL RADIOLOGY | Facility: CLINIC | Age: 39
End: 2019-08-16
Attending: PHYSICIAN ASSISTANT
Payer: COMMERCIAL

## 2019-08-16 VITALS
TEMPERATURE: 98.3 F | HEART RATE: 62 BPM | BODY MASS INDEX: 23.62 KG/M2 | DIASTOLIC BLOOD PRESSURE: 70 MMHG | HEIGHT: 70 IN | WEIGHT: 165 LBS | OXYGEN SATURATION: 100 % | SYSTOLIC BLOOD PRESSURE: 132 MMHG

## 2019-08-16 DIAGNOSIS — Z98.890 S/P CERVICAL DISC REPLACEMENT: Primary | ICD-10-CM

## 2019-08-16 DIAGNOSIS — Z98.890 S/P CERVICAL DISC REPLACEMENT: ICD-10-CM

## 2019-08-16 PROCEDURE — 99213 OFFICE O/P EST LOW 20 MIN: CPT | Performed by: PHYSICIAN ASSISTANT

## 2019-08-16 PROCEDURE — G0463 HOSPITAL OUTPT CLINIC VISIT: HCPCS

## 2019-08-16 PROCEDURE — 72040 X-RAY EXAM NECK SPINE 2-3 VW: CPT

## 2019-08-16 ASSESSMENT — MIFFLIN-ST. JEOR: SCORE: 1669.69

## 2019-08-16 ASSESSMENT — PAIN SCALES - GENERAL: PAINLEVEL: SEVERE PAIN (7)

## 2019-08-16 NOTE — NURSING NOTE
"Adrian Kurtz is a 39 year old male who presents for:  Chief Complaint   Patient presents with     Surgical Followup     S/P 2/6/18 C5-6 Arthroplasty.         Initial Vitals:  /70 (BP Location: Right arm, Patient Position: Sitting, Cuff Size: Adult Large)   Pulse 62   Temp 98.3  F (36.8  C) (Oral)   Ht 5' 10\" (1.778 m)   Wt 165 lb (74.8 kg)   SpO2 100%   BMI 23.68 kg/m   Estimated body mass index is 23.68 kg/m  as calculated from the following:    Height as of this encounter: 5' 10\" (1.778 m).    Weight as of this encounter: 165 lb (74.8 kg).. Body surface area is 1.92 meters squared. BP completed using cuff size: large  Severe Pain (7)        Nursing Comments: Patient states his pain level is at a 7 out of 10. He states he has neck pain and continued right arm pain and tingling and numbness.        Holli Edge  \  "

## 2019-08-16 NOTE — PROGRESS NOTES
[facility]  Neurosurgery followup:    HPI: ~1.5 years s/p C5-6 arthroplasty. States he did well initially post op, but over the past several months has had worsening right arm pain. Pain located in right neck and radiates into right arm into all fingers. He states he did not follow up as he felt he was doing well. He was seen in UC yesterday and given MDP and advised to follow up with us. Denies overt weakness or bladder/bowel changes.    Exam:  Constitutional:  Alert, well nourished, NAD.  HEENT: Normocephalic, atraumatic.   Pulm:  Without shortness of breath   CV:  No pitting edema of BLE.     Neurological:  Awake  Alert  Oriented x 3  Motor exam:     Shoulder Abduction:  Right:  5/5    Left:  5/5  Biceps:                      Right:  5/5    Left:  5/5  Triceps:                     Right:  5/5    Left:  5/5   strength:            Right: 4+/5      Left:  5/5     Able to spontaneously move U/E bilaterally  Sensation intact throughout all U/E dermatomes  Incision: Nicely healed  Imaging: AP and lateral cervical films reveal stable hardware  A/P: 1.5 year s/p C5-6 arthroplasty. States he did well initially post op, but over the past several months has had worsening right arm pain. Pain located in right neck and radiates into right arm into all fingers. XRs with stable hardware. He does have slightly weakened right  strength. Will have him start with PT and encouraged to begin MDP. Advised to contact clinic if pain persists and can obtain cervical MRI. Patient voiced understanding and agreement.            Georgie Josue PA-C  [facility]  [address] [address]    Tel [phone]  Pager [phone]

## 2019-08-16 NOTE — LETTER
8/16/2019         RE: Adrian Kurtz  [address]  [address]  [place] [address]        Dear Colleague,    Thank you for referring your patient, Adrian Kurtz, to the [facility] NEUROSURGERY CLINIC. Please see a copy of my visit note below.    [facility]  Neurosurgery followup:    HPI: ~1.5 years s/p C5-6 arthroplasty. States he did well initially post op, but over the past several months has had worsening right arm pain. Pain located in right neck and radiates into right arm into all fingers. He states he did not follow up as he felt he was doing well. He was seen in  yesterday and given MDP and advised to follow up with us. Denies overt weakness or bladder/bowel changes.    Exam:  Constitutional:  Alert, well nourished, NAD.  HEENT: Normocephalic, atraumatic.   Pulm:  Without shortness of breath   CV:  No pitting edema of BLE.     Neurological:  Awake  Alert  Oriented x 3  Motor exam:     Shoulder Abduction:  Right:  5/5    Left:  5/5  Biceps:                      Right:  5/5    Left:  5/5  Triceps:                     Right:  5/5    Left:  5/5   strength:            Right: 4+/5      Left:  5/5     Able to spontaneously move U/E bilaterally  Sensation intact throughout all U/E dermatomes  Incision: Nicely healed  Imaging: AP and lateral cervical films reveal stable hardware  A/P: 1.5 year s/p C5-6 arthroplasty. States he did well initially post op, but over the past several months has had worsening right arm pain. Pain located in right neck and radiates into right arm into all fingers. XRs with stable hardware. He does have slightly weakened right  strength. Will have him start with PT and encouraged to begin MDP. Advised to contact clinic if pain persists and can obtain cervical MRI. Patient voiced understanding and agreement.            Georgie Josue PA-C  [facility]  [address]  [address] [place] [address]    Tel  [phone]  Pager [phone]      Again, thank you for allowing me to participate in the care of your patient.        Sincerely,        Georgie Josue PA-C

## 2019-08-16 NOTE — LETTER
[facility]   [facility]  [address] [place] [address]  [phone]      To Whom it May Concern,          Adrian Kurtz was seen in clinic on 8/16/2019. Please excuse from work today.            Sincerely,          Georgie Josue PA-C  [facility]  [address] [address]    Tel [phone]

## 2019-08-16 NOTE — PATIENT INSTRUCTIONS
- Continue to increase activity as tolerated; no restrictions  - followup in 6 months with xray prior     - Call the clinic at [phone] for increased pain or any other questions and concerns  SMOKING CESSATION  What's in cigarette smoke? - Cigarette smoke contains over 4,000 chemicals. Nicotine is one of the main ingredients which is an insecticide/herbicide. It is poisonous to our nervous system, increases blood clotting risk, and decreases the body's defenses to fight off infection. Another chemical is Carbon Monoxide is an asphyxiating gas that permanently binds to blood cells and blocks the transport of oxygen. This leads to tissue death and decreases your metabolism. Tar is a chemical that coats your lungs and trachea which impairs new oxygen coming in and carbon dioxide getting out of your body.   How does smoking impact surgery? - Smoking is particularly hazardous with regards to surgery. Surgery puts stress on the body and a smoker's body is already under strain from these chemicals. Putting the two together, especially for an elective surgery, could be a recipe for disaster. Smoking before and after surgery increases your risk of heart problems, slow wound healing, delayed bone healing, blood clots, wound infection and anesthesia complications.   What are the benefits of quitting? - In 20 minutes your blood pressure will drop back down to normal. In 8 hours the carbon monoxide (a toxic gas) levels in your blood stream will drop by half, and oxygen levels will return to normal. In 48 hours your chance of having a heart attack will have decreased. All nicotine will have left your body. Your sense of taste and smell will return to a normal level. In 72 hours your bronchial tubes will relax, and your energy levels will increase. In 2 weeks your circulation will increase, and it will continue to improve for the next 10 weeks.    Recommendations for elective surgery - Ideally, patients should quit smoking 8  weeks before and at least 2 weeks after elective surgery in order to avoid complications. Simply cutting back on the amount of cigarettes smoked per day does not offer any benefit or decrease the risk of poor wound healing, heart problems, and infection. Smokers should also start taking Vitamin C and B for two weeks before surgery and two weeks after surgery.    Ways to Stop Smokin. Nicotine patches, lozenges, or gum  2. Support Groups  3. Medications (see below)    List of Medications:  1. Varenicline Tartrate (CHANTIX)   2. Bupropion HCL (WELLBUTRIN, ZYBAN) - note: make sure Wellbutrin is for smoking cessation and not other issues   3. Nicotine Patch (NICODERM)   4. Nicotine Inhaler (NICOTROL)   5. Nicotine Gum Nicotine Polacrilex   6. Nicotine Lozenge: Nicotine Polacrilex (COMMIT)   * [place] offers a smoking support group as well!  Please visit: [email or web address]  If you are interested in these, ask about getting a prescription or talk to your primary care doctor about what may be the best way for you to quit.     1. Physical therapy ordered - they will contact you to schedule    2. Start medrol dosepak    3. Please contact the clinic if pain persists at [phone].

## 2019-08-23 ENCOUNTER — OFFICE VISIT (OUTPATIENT)
Dept: FAMILY MEDICINE | Facility: CLINIC | Age: 39
End: 2019-08-23
Payer: COMMERCIAL

## 2019-08-23 VITALS
TEMPERATURE: 98.7 F | DIASTOLIC BLOOD PRESSURE: 68 MMHG | OXYGEN SATURATION: 99 % | HEART RATE: 78 BPM | SYSTOLIC BLOOD PRESSURE: 102 MMHG | WEIGHT: 167 LBS | BODY MASS INDEX: 23.91 KG/M2 | HEIGHT: 70 IN

## 2019-08-23 DIAGNOSIS — E78.5 HYPERLIPIDEMIA WITH TARGET LDL LESS THAN 130: ICD-10-CM

## 2019-08-23 DIAGNOSIS — Z00.00 ROUTINE GENERAL MEDICAL EXAMINATION AT A HEALTH CARE FACILITY: Primary | ICD-10-CM

## 2019-08-23 DIAGNOSIS — M50.30 DDD (DEGENERATIVE DISC DISEASE), CERVICAL: ICD-10-CM

## 2019-08-23 DIAGNOSIS — Z72.0 TOBACCO ABUSE: ICD-10-CM

## 2019-08-23 LAB
ALBUMIN SERPL-MCNC: 4.3 G/DL (ref 3.4–5)
ALP SERPL-CCNC: 55 U/L (ref 40–150)
ALT SERPL W P-5'-P-CCNC: 36 U/L (ref 0–70)
ANION GAP SERPL CALCULATED.3IONS-SCNC: 8 MMOL/L (ref 3–14)
AST SERPL W P-5'-P-CCNC: 22 U/L (ref 0–45)
BILIRUB SERPL-MCNC: 0.3 MG/DL (ref 0.2–1.3)
BUN SERPL-MCNC: 10 MG/DL (ref 7–30)
CALCIUM SERPL-MCNC: 9.1 MG/DL (ref 8.5–10.1)
CHLORIDE SERPL-SCNC: 110 MMOL/L (ref 94–109)
CHOLEST SERPL-MCNC: 215 MG/DL
CO2 SERPL-SCNC: 24 MMOL/L (ref 20–32)
CREAT SERPL-MCNC: 0.94 MG/DL (ref 0.66–1.25)
GFR SERPL CREATININE-BSD FRML MDRD: >90 ML/MIN/{1.73_M2}
GLUCOSE SERPL-MCNC: 86 MG/DL (ref 70–99)
HDLC SERPL-MCNC: 58 MG/DL
LDLC SERPL CALC-MCNC: 133 MG/DL
NONHDLC SERPL-MCNC: 157 MG/DL
POTASSIUM SERPL-SCNC: 4 MMOL/L (ref 3.4–5.3)
PROT SERPL-MCNC: 7.5 G/DL (ref 6.8–8.8)
SODIUM SERPL-SCNC: 142 MMOL/L (ref 133–144)
TRIGL SERPL-MCNC: 122 MG/DL

## 2019-08-23 PROCEDURE — 80061 LIPID PANEL: CPT | Performed by: FAMILY MEDICINE

## 2019-08-23 PROCEDURE — 36415 COLL VENOUS BLD VENIPUNCTURE: CPT | Performed by: FAMILY MEDICINE

## 2019-08-23 PROCEDURE — 80053 COMPREHEN METABOLIC PANEL: CPT | Performed by: FAMILY MEDICINE

## 2019-08-23 PROCEDURE — 99395 PREV VISIT EST AGE 18-39: CPT | Performed by: FAMILY MEDICINE

## 2019-08-23 RX ORDER — HYDROCODONE BITARTRATE AND ACETAMINOPHEN 5; 325 MG/1; MG/1
1 TABLET ORAL 3 TIMES DAILY PRN
Qty: 18 TABLET | Refills: 0 | Status: SHIPPED | OUTPATIENT
Start: 2019-08-23 | End: 2019-08-26

## 2019-08-23 RX ORDER — TIZANIDINE HYDROCHLORIDE 4 MG/1
4 CAPSULE, GELATIN COATED ORAL AT BEDTIME
Qty: 30 CAPSULE | Refills: 0 | Status: SHIPPED | OUTPATIENT
Start: 2019-08-23 | End: 2021-09-20

## 2019-08-23 ASSESSMENT — MIFFLIN-ST. JEOR: SCORE: 1678.76

## 2019-08-23 NOTE — PROGRESS NOTES
SUBJECTIVE:   CC: Adrian Kurtz is an 39 year old male who presents for preventive health visit.     Healthy Habits:  Do you get at least three servings of calcium containing foods daily (dairy, green leafy vegetables, etc.)? No    Amount of exercise or daily activities, outside of work: Active job     Problems taking medications regularly No    Medication side effects: No    Have you had an eye exam in the past two years? no    Do you see a dentist twice per year? no    Do you have sleep apnea, excessive snoring or daytime drowsiness?yes          Today's PHQ-2 Score:   PHQ-2 ( 1999 Pfizer) 12/22/2017   Q1: Little interest or pleasure in doing things 0   Q2: Feeling down, depressed or hopeless 0   PHQ-2 Score 0       Abuse: Current or Past(Physical, Sexual or Emotional)- No  Do you feel safe in your environment? Yes    Social History     Tobacco Use     Smoking status: Current Every Day Smoker     Packs/day: 1.00     Years: 12.00     Pack years: 12.00     Types: Cigarettes, Cigars     Smokeless tobacco: Never Used   Substance Use Topics     Alcohol use: Yes     Alcohol/week: 10.0 oz     Types: 20 Standard drinks or equivalent per week     If you drink alcohol do you typically have >3 drinks per day or >7 drinks per week? No                      Last PSA: No results found for: PSA    Reviewed orders with patient. Reviewed health maintenance and updated orders accordingly - Yes  Lab work is in process  Labs reviewed in EPIC  BP Readings from Last 3 Encounters:   08/23/19 102/68   08/16/19 132/70   03/21/18 120/74    Wt Readings from Last 3 Encounters:   08/23/19 75.8 kg (167 lb)   08/16/19 74.8 kg (165 lb)   02/06/18 71.5 kg (157 lb 9.6 oz)                    Reviewed and updated as needed this visit by clinical staff  Tobacco  Allergies  Meds         Reviewed and updated as needed this visit by Provider        Here today for routine checkup and follow-up on ongoing issues.  When I saw him last about a year and  "a half ago he was about to undergo cervical disc replacement.  Surgery was successful but the patient had a change in insurance and never really went through the physical therapy that he was planning on.  Neck is been giving him a lot of trouble and he recently saw a urgent care and neurosurgery.  They want to start him on some physical therapy.  Neck is very stiff and painful especially at night.  He also notes a dad is been dealing with amyloidosis and is fairly sick from it.  We did discussion about the disease process, etc.  Is not specifically a genetic disease and not something that we test \"for.\"  But it is a good chance to take a look at basic cardiovascular risks.  He has a history of some elevated cholesterol in the past.  Would like to quit smoking and was successful with Chantix in the past    ROS:  CONSTITUTIONAL: NEGATIVE for fever, chills, change in weight  INTEGUMENTARY/SKIN: NEGATIVE for worrisome rashes, moles or lesions  EYES: NEGATIVE for vision changes or irritation  ENT: NEGATIVE for ear, mouth and throat problems  RESP: NEGATIVE for significant cough or SOB  CV: NEGATIVE for chest pain, palpitations or peripheral edema  GI: NEGATIVE for nausea, abdominal pain, heartburn, or change in bowel habits   male: negative for dysuria, hematuria, decreased urinary stream, erectile dysfunction, urethral discharge  MUSCULOSKELETAL: as above   NEURO: NEGATIVE for weakness, dizziness or paresthesias  PSYCHIATRIC: NEGATIVE for changes in mood or affect    OBJECTIVE:   /68 (BP Location: Right arm, Patient Position: Chair, Cuff Size: Adult Regular)   Pulse 78   Temp 98.7  F (37.1  C) (Oral)   Ht 1.778 m (5' 10\")   Wt 75.8 kg (167 lb)   SpO2 99%   BMI 23.96 kg/m    EXAM:  GENERAL: healthy, alert and no distress  EYES: Eyes grossly normal to inspection, PERRL and conjunctivae and sclerae normal  HENT: ear canals and TM's normal, nose and mouth without ulcers or lesions  NECK: no adenopathy, no " asymmetry, masses, or scars and thyroid normal to palpation  RESP: lungs clear to auscultation - no rales, rhonchi or wheezes  CV: regular rate and rhythm, normal S1 S2, no S3 or S4, no murmur, click or rub, no peripheral edema and peripheral pulses strong  ABDOMEN: soft, nontender, no hepatosplenomegaly, no masses and bowel sounds normal  MS: no gross musculoskeletal defects noted, no edema  SKIN: no suspicious lesions or rashes  NEURO: Normal strength and tone, mentation intact and speech normal  PSYCH: mentation appears normal, affect normal/bright    Diagnostic Test Results:  Labs reviewed in Epic    ASSESSMENT/PLAN:   1. Routine general medical examination at a health care facility    - Lipid panel reflex to direct LDL Fasting    2. DDD (degenerative disc disease), cervical  Short-term pain management program.  Discussed with patient that I am not taking on chronic pain patients and I do not think that fits with him anyway.  Usage is been very intermittent  - nabumetone (RELAFEN) 750 MG tablet; Take 1 tablet (750 mg) by mouth 2 times daily as needed for pain  Dispense: 30 tablet; Refill: 1  - tiZANidine (ZANAFLEX) 4 MG capsule; Take 1 capsule (4 mg) by mouth At Bedtime Neck pain  Dispense: 30 capsule; Refill: 0  - HYDROcodone-acetaminophen (NORCO) 5-325 MG tablet; Take 1 tablet by mouth 3 times daily as needed for severe pain  Dispense: 18 tablet; Refill: 0    3. Tobacco abuse    - varenicline (CHANTIX KATIE) 0.5 MG X 11 & 1 MG X 42 tablet; Take 0.5 mg tab daily for 3 days, THEN 0.5 mg tab twice daily for 4 days, THEN 1 mg twice daily.  Dispense: 53 tablet; Refill: 0    4. Hyperlipidemia with target LDL less than 130    - Comprehensive metabolic panel    COUNSELING:  Reviewed preventive health counseling, as reflected in patient instructions       Regular exercise       Healthy diet/nutrition       Vision screening    Estimated body mass index is 23.96 kg/m  as calculated from the following:    Height as of this  "encounter: 1.778 m (5' 10\").    Weight as of this encounter: 75.8 kg (167 lb).         reports that he has been smoking cigarettes and cigars.  He has a 12.00 pack-year smoking history. He has never used smokeless tobacco.      Counseling Resources:  ATP IV Guidelines  Pooled Cohorts Equation Calculator  FRAX Risk Assessment  ICSI Preventive Guidelines  Dietary Guidelines for Americans, 2010  USDA's MyPlate  ASA Prophylaxis  Lung CA Screening    Charmaine Oliver MD  [facility]  "

## 2019-08-23 NOTE — LETTER
August 26, 2019      Adrian Kurtz  [address]  [address]  [place] [address]        Dear ,    We are writing to inform you of your test results.    Your lab work looks just fine.  Keep up the good work.     Resulted Orders   Comprehensive metabolic panel   Result Value Ref Range    Sodium 142 133 - 144 mmol/L    Potassium 4.0 3.4 - 5.3 mmol/L    Chloride 110 (H) 94 - 109 mmol/L    Carbon Dioxide 24 20 - 32 mmol/L    Anion Gap 8 3 - 14 mmol/L    Glucose 86 70 - 99 mg/dL      Comment:      Fasting specimen    Urea Nitrogen 10 7 - 30 mg/dL    Creatinine 0.94 0.66 - 1.25 mg/dL    GFR Estimate >90 >60 mL/min/[1.73_m2]      Comment:      Non  GFR Calc  Starting 12/18/2018, serum creatinine based estimated GFR (eGFR) will be   calculated using the Chronic Kidney Disease Epidemiology Collaboration   (CKD-EPI) equation.      GFR Estimate If Black >90 >60 mL/min/[1.73_m2]      Comment:       GFR Calc  Starting 12/18/2018, serum creatinine based estimated GFR (eGFR) will be   calculated using the Chronic Kidney Disease Epidemiology Collaboration   (CKD-EPI) equation.      Calcium 9.1 8.5 - 10.1 mg/dL    Bilirubin Total 0.3 0.2 - 1.3 mg/dL    Albumin 4.3 3.4 - 5.0 g/dL    Protein Total 7.5 6.8 - 8.8 g/dL    Alkaline Phosphatase 55 40 - 150 U/L    ALT 36 0 - 70 U/L    AST 22 0 - 45 U/L   Lipid panel reflex to direct LDL Fasting   Result Value Ref Range    Cholesterol 215 (H) <200 mg/dL      Comment:      Desirable:       <200 mg/dl    Triglycerides 122 <150 mg/dL      Comment:      Fasting specimen    HDL Cholesterol 58 >39 mg/dL    LDL Cholesterol Calculated 133 (H) <100 mg/dL      Comment:      Above desirable:  100-129 mg/dl  Borderline High:  130-159 mg/dL  High:             160-189 mg/dL  Very high:       >189 mg/dl      Non HDL Cholesterol 157 (H) <130 mg/dL      Comment:      Above Desirable:  130-159 mg/dl  Borderline high:  160-189 mg/dl  High:             190-219  mg/dl  Very high:       >219 mg/dl         If you have any questions or concerns, please call the clinic at the number listed above.       Sincerely,        Charmaine Oliver MD

## 2019-08-26 NOTE — RESULT ENCOUNTER NOTE
Please mail results and note to patient:    Your lab work looks just fine.  Keep up the good work.  FOSTER Oliver MD

## 2019-08-27 ENCOUNTER — THERAPY VISIT (OUTPATIENT)
Dept: PHYSICAL THERAPY | Facility: CLINIC | Age: 39
End: 2019-08-27
Payer: COMMERCIAL

## 2019-08-27 DIAGNOSIS — G44.209 TENSION HEADACHE: ICD-10-CM

## 2019-08-27 DIAGNOSIS — M54.12 CERVICAL RADICULOPATHY: Primary | ICD-10-CM

## 2019-08-27 DIAGNOSIS — M54.2 NECK PAIN: ICD-10-CM

## 2019-08-27 DIAGNOSIS — Z98.890 S/P CERVICAL DISC REPLACEMENT: ICD-10-CM

## 2019-08-27 PROCEDURE — 97162 PT EVAL MOD COMPLEX 30 MIN: CPT | Mod: GP | Performed by: PHYSICAL THERAPIST

## 2019-08-27 PROCEDURE — 97110 THERAPEUTIC EXERCISES: CPT | Mod: GP | Performed by: PHYSICAL THERAPIST

## 2019-08-27 PROCEDURE — 97140 MANUAL THERAPY 1/> REGIONS: CPT | Mod: GP | Performed by: PHYSICAL THERAPIST

## 2019-08-27 NOTE — LETTER
TILA KENYON PT  [address] [facility] [address]  [address]  [place] [address]  [phone]    2019    Re: Adrian Kurtz   :   1980  MRN:  [id number]   REFERRING PHYSICIAN:   DREW Huston PT  Date of Initial Evaluation:  2019  Visits:  Rxs Used: 1  Reason for Referral:     S/P cervical disc replacement  Cervical radiculopathy  Neck pain  Tension headache    EVALUATION SUMMARY    [facility] Initial Evaluation  Subjective:  The history is provided by the patient.   Adrian Kurtz being seen for Rt UE/neck pain Rt hand numbness , headaches daily .   Problem began 2018. Where condition occurred: during recreation / sport.Problem occurred: unknown, years of neck pain   and reported as 10/10 on pain scale. General health as reported by patient is good. Pertinent medical history includes:  Smoking.    Surgeries include:  Orthopedic surgery (disc rep CS 2018).  Current medications:  Pain medication.   and is intermittent. Pain is worse in the P.M.. Since onset symptoms are unchanged.  Previous treatment includes surgery. There was moderate improvement following previous treatment.    Restrictions include:  Working in normal job without restrictions.    Barriers include:  None as reported by patient.  Red flags:  None as reported by patient.  Type of problem:  Cervical spine  Condition occurred with:  Degenerative joint disease. This is a chronic condition    Patient reports pain:  Cervical right side, lower cervical spine and cervical left side. Radiates to:  Head (4/10). Associated symptoms:  Headache, loss of motion/stiffness, numbness and tingling. Symptoms are exacerbated by lifting, carrying and driving     Objective:  Cervical/Thoracic Evaluation  AROM:  AROM Cervical:  Flexion:            Mod loss   Extension:       Retxn mod loss ++, ret/ext mod loss++   Rotation:         Left: 30% loss ++      Right: 30% loss ++   Side Bend:      Left: +++pain       Right:  +++pain   Strength: Rt UE 5/5 w/ pain provoc. quick to fatigue in UE tests     Re: Adrian Kurtz   :   1980    Headaches: cervical  Cervical Myotomes:  normal  DTR's:  not assessed    Neural Tension:    Right side:  Ulnar positive.    Cervical Dermatomes:  normal    Cervical Palpation:  : severe TTP levat trigger, scalenes, subscap, adonis Rt side   Tenderness present at Right:    Scalenes; Upper Trap; Levator; Erector Spinae and Suboccipitals    Cervical Stability/Joint Clearing:    Left negative at: TOS Screen  Right positive at:  1st Rib; TOS Screen and Mobility    Spinal Segmental Conclusions:    Level:  Hypo at C6, C7, C5 and T1    Cord Sign:  not assessed    Shoulder Evaluation:  Stability Testing:    Right shoulder stability positive testing:Load and shift anterior    Special Tests:    Right shoulder positive for the following special tests:Labral  Right shoulder negative for the following special tests:Impingement; Bursal; Rotator cuff tear and Acrimioclavicular    Palpation:    Right shoulder tenderness present at: Biceps; Supraspinatus; Infraspinatus; Teres Minor; Subscapularis; Levator; Rhomboids; Upper Trap and Bicipital Groove  Mobility Tests:  not assessed    Assessment/Plan:    Patient is a 39 year old male with cervical and right side shoulder complaints.    Patient has the following significant findings with corresponding treatment plan.                Diagnosis 1:  Cervical radiculopathy Rt   Pain -  US, manual therapy, self management, directional preference exercise and home program  Decreased ROM/flexibility - manual therapy, therapeutic exercise and home program  Decreased joint mobility - manual therapy, therapeutic exercise and home program  Decreased strength - therapeutic exercise, therapeutic activities and home program  Decreased proprioception - neuro re-education and therapeutic activities  Impaired muscle performance - neuro re-education  Decreased function -  therapeutic activities and home program  Impaired posture - neuro re-education and home program  Instability -  Therapeutic Activity  Therapeutic Exercise  Neuromuscular Re-education  Re: Adrian Kurtz   :   1980    Therapy Evaluation Codes:   1) History comprised of:   Personal factors that impact the plan of care:      Coping style, Overall behavior pattern and Past/current experiences.    Comorbidity factors that impact the plan of care are:      Smoking.     Medications impacting care: Pain and Steroids.  2) Examination of Body Systems comprised of:   Body structures and functions that impact the plan of care:      Cervical spine and Shoulder.   Activity limitations that impact the plan of care are:      Bending, Driving, Dressing, Lifting, Reading/Computer work, Sitting, Sports, Standing, Walking, Working and Sleeping.  3) Clinical presentation characteristics are:   Evolving/Changing.  4) Decision-Making    Moderate complexity using standardized patient assessment instrument and/or measureable assessment of functional outcome.  Cumulative Therapy Evaluation is: Moderate complexity.    Previous and current functional limitations:  (See Goal Flow Sheet for this information)    Short term and Long term goals: (See Goal Flow Sheet for this information)     Communication ability:  Patient appears to be able to clearly communicate and understand verbal and written communication and follow directions correctly.  Treatment Explanation - The following has been discussed with the patient:   RX ordered/plan of care  Anticipated outcomes  Possible risks and side effects  This patient would benefit from PT intervention to resume normal activities.   Rehab potential is good.    Frequency:  2 X week, once daily  Duration:  for 6 weeks  Discharge Plan:  Achieve all LTG.  Independent in home treatment program.  Reach maximal therapeutic benefit.    Please refer to the daily flowsheet for treatment today, total  treatment time and time spent performing 1:1 timed codes.     Thank you for your referral.    INQUIRIES  Therapist: Kang Kim, PT   TILA KENYON PT  [address] [address]  [place] [address]  Phone: [phone]  Fax: [phone]

## 2019-08-27 NOTE — PROGRESS NOTES
[facility] Initial Evaluation  Subjective:  The history is provided by the patient.   Adrian Kurtz being seen for Rt UE/neck pain Rt hand numbness , headaches daily .   Problem began 2/5/2018. Where condition occurred: during recreation / sport.Problem occurred: unknown, years of neck pain   and reported as 10/10 on pain scale. General health as reported by patient is good. Pertinent medical history includes:  Smoking.    Surgeries include:  Orthopedic surgery (disc rep CS 2018).  Current medications:  Pain medication.      and is intermittent. Pain is worse in the P.M.. Since onset symptoms are unchanged.  Previous treatment includes surgery. There was moderate improvement following previous treatment.    Restrictions include:  Working in normal job without restrictions.    Barriers include:  None as reported by patient.  Red flags:  None as reported by patient.  Type of problem:  Cervical spine   Condition occurred with:  Degenerative joint disease. This is a chronic condition    Patient reports pain:  Cervical right side, lower cervical spine and cervical left side. Radiates to:  Head (4/10). Associated symptoms:  Headache, loss of motion/stiffness, numbness and tingling. Symptoms are exacerbated by lifting, carrying and driving                       Objective:  System              Cervical/Thoracic Evaluation    AROM:  AROM Cervical:    Flexion:            Mod loss   Extension:       Retxn mod loss ++, ret/ext mod loss++   Rotation:         Left: 30% loss ++      Right: 30% loss ++   Side Bend:      Left: +++pain      Right:  +++pain     Strength: Rt UE 5/5 w/ pain provoc. quick to fatigue in UE tests   Headaches: cervical  Cervical Myotomes:  normal                  DTR's:  not assessed        Neural Tension:      Right side:  Ulnar positive.  Cervical Dermatomes:  normal                    Cervical Palpation:  : severe TTP levat trigger, scalenes, subscap, adonis Rt side     Tenderness  present at Right:    Scalenes; Upper Trap; Levator; Erector Spinae and Suboccipitals    Cervical Stability/Joint Clearing:      Left negative at: TOS Screen  Right positive at:  1st Rib; TOS Screen and Mobility  Spinal Segmental Conclusions:    Level:  Hypo at C6, C7, C5 and T1      Cord Sign:  not assessed         Shoulder Evaluation:    Stability Testing:      Right shoulder stability positive testing:Load and shift anterior  Special Tests:      Right shoulder positive for the following special tests:Labral  Right shoulder negative for the following special tests:Impingement; Bursal; Rotator cuff tear and Acrimioclavicular  Palpation:      Right shoulder tenderness present at: Biceps; Supraspinatus; Infraspinatus; Teres Minor; Subscapularis; Levator; Rhomboids; Upper Trap and Bicipital Groove  Mobility Tests:  not assessed                                                 General     ROS    Assessment/Plan:    Patient is a 39 year old male with cervical and right side shoulder complaints.    Patient has the following significant findings with corresponding treatment plan.                Diagnosis 1:  Cervical radiculopathy Rt   Pain -  US, manual therapy, self management, directional preference exercise and home program  Decreased ROM/flexibility - manual therapy, therapeutic exercise and home program  Decreased joint mobility - manual therapy, therapeutic exercise and home program  Decreased strength - therapeutic exercise, therapeutic activities and home program  Decreased proprioception - neuro re-education and therapeutic activities  Impaired muscle performance - neuro re-education  Decreased function - therapeutic activities and home program  Impaired posture - neuro re-education and home program  Instability -  Therapeutic Activity  Therapeutic Exercise  Neuromuscular Re-education    Therapy Evaluation Codes:   1) History comprised of:   Personal factors that impact the plan of care:      Coping style, Overall  behavior pattern and Past/current experiences.    Comorbidity factors that impact the plan of care are:      Smoking.     Medications impacting care: Pain and Steroids.  2) Examination of Body Systems comprised of:   Body structures and functions that impact the plan of care:      Cervical spine and Shoulder.   Activity limitations that impact the plan of care are:      Bending, Driving, Dressing, Lifting, Reading/Computer work, Sitting, Sports, Standing, Walking, Working and Sleeping.  3) Clinical presentation characteristics are:   Evolving/Changing.  4) Decision-Making    Moderate complexity using standardized patient assessment instrument and/or measureable assessment of functional outcome.  Cumulative Therapy Evaluation is: Moderate complexity.    Previous and current functional limitations:  (See Goal Flow Sheet for this information)    Short term and Long term goals: (See Goal Flow Sheet for this information)     Communication ability:  Patient appears to be able to clearly communicate and understand verbal and written communication and follow directions correctly.  Treatment Explanation - The following has been discussed with the patient:   RX ordered/plan of care  Anticipated outcomes  Possible risks and side effects  This patient would benefit from PT intervention to resume normal activities.   Rehab potential is good.    Frequency:  2 X week, once daily  Duration:  for 6 weeks  Discharge Plan:  Achieve all LTG.  Independent in home treatment program.  Reach maximal therapeutic benefit.    Please refer to the daily flowsheet for treatment today, total treatment time and time spent performing 1:1 timed codes.

## 2019-09-25 ENCOUNTER — OFFICE VISIT (OUTPATIENT)
Dept: FAMILY MEDICINE | Facility: CLINIC | Age: 39
End: 2019-09-25
Payer: COMMERCIAL

## 2019-09-25 VITALS
SYSTOLIC BLOOD PRESSURE: 106 MMHG | HEIGHT: 70 IN | DIASTOLIC BLOOD PRESSURE: 68 MMHG | WEIGHT: 161 LBS | OXYGEN SATURATION: 98 % | HEART RATE: 82 BPM | TEMPERATURE: 98.3 F | BODY MASS INDEX: 23.05 KG/M2

## 2019-09-25 DIAGNOSIS — Z20.828 EXPOSURE TO HERPES SIMPLEX VIRUS (HSV): Primary | ICD-10-CM

## 2019-09-25 PROCEDURE — 99214 OFFICE O/P EST MOD 30 MIN: CPT | Performed by: FAMILY MEDICINE

## 2019-09-25 RX ORDER — PREDNISONE 20 MG/1
TABLET ORAL
COMMUNITY
Start: 2019-08-15 | End: 2021-09-20

## 2019-09-25 RX ORDER — TRIAMCINOLONE ACETONIDE 5 MG/G
0.1 CREAM TOPICAL 2 TIMES DAILY
COMMUNITY
End: 2021-09-20

## 2019-09-25 ASSESSMENT — MIFFLIN-ST. JEOR: SCORE: 1651.54

## 2019-09-25 NOTE — PROGRESS NOTES
"Subjective     Adrian Kurtz is a 39 year old male who presents to clinic today for the following health issues:    HPI   ED/UC Followup:    Facility:  [facility]  Date of visit: 9/12/19  Reason for visit: Penile Rash/ Herpes  Current Status: Patient has been having problems with medications prescribed having side effects of severe bad dreams and loss of appetite, no changes of symptoms per patient.      SUBJECTIVE:  Here today primarily in follow-up of recent ER visit for a penile rash.  Full records reviewed with patient and through care everywhere.  Incidentally he notes that he was having some bad dreams related to the Chantix and decided to stop it for now.  We discussed that this can be a side effect that can come and go during the course of treatment.  In any case he was having some irritation near his distal penis and per the report it seems more consistent with skin irritation than herpes.  But the blood test returned positive IgG for HSV 1.  So we spent our time today discussing what this means.  Classically this would mean a past exposure to cold sores but things have become blended over time.  His HSV 2 testing was negative.  The patient has never had any oral cold sores to his knowledge.  He is never had any penile lesions consistent with herpes that he knows of.  Has a relatively new girlfriend and said he encouraged her to go into get tested for everything and he does not yet know the results.  So we discussed how to interpret these various tests and what their limitations are.    Review of systems otherwise negative.  Past medical, family, and social history reviewed and updated in chart.    OBJECTIVE:  /68 (BP Location: Right arm, Patient Position: Chair, Cuff Size: Adult Regular)   Pulse 82   Temp 98.3  F (36.8  C) (Oral)   Ht 1.778 m (5' 10\")   Wt 73 kg (161 lb)   SpO2 98%   BMI 23.10 kg/m    Alert, pleasant, upbeat, and in no apparent discomfort.  S1 and S2 normal, no " murmurs, clicks, gallops or rubs. Regular rate and rhythm. Chest is clear; no wheezes or rales. No edema or JVD.  Past labs reviewed with the patient.     ASSESSMENT / PLAN:  (Z20.348) Exposure to herpes simplex virus (HSV)  (primary encounter diagnosis)  Comment: The best I can tell him right now is that he has had a past exposure to HSV 1 and formed antibodies.  Whether this means he will ever develop any active oral or genital lesions as yet to be determined but based upon his history he seems to be in the clear.  I showed him pictures online of what hepatic lesions look like and if any of these develop I would like him to contact me so we can directly test them.  Plan:     Follow up for future outbreaks if needed  S. Eliel Oliver MD    (Chart documentation completed in part with Dragon voice-recognition software.  Even though reviewed some grammatical, spelling, and word errors may remain.)

## 2019-09-25 NOTE — LETTER
[address] [address]  Phone: [phone]    September 25, 2019        Adrian Kurtz  [address]  [address]  [facility] [address]          To whom it may concern:    RE: Adrian Kurtz    Patient was seen and treated today at our clinic.  Off work 9/16 - 9/17/19 due to illness.    Please contact me for questions or concerns.      Sincerely,        Charmaine Oliver MD

## 2019-09-25 NOTE — LETTER
[address] [address]  Phone: [phone]    September 25, 2019        Adrian Kurtz  [address]  [address]  [facility] [address]          To whom it may concern:    RE: Adrian Kurtz    Patient was seen and treated today at our clinic.  Off work 9/24/19 due to illness    Please contact me for questions or concerns.      Sincerely,        Charmaine Oliver MD

## 2019-11-01 PROBLEM — M54.2 NECK PAIN: Status: RESOLVED | Noted: 2019-08-27 | Resolved: 2019-11-01

## 2019-11-01 PROBLEM — G44.209 TENSION HEADACHE: Status: RESOLVED | Noted: 2019-08-27 | Resolved: 2019-11-01

## 2021-09-20 ENCOUNTER — OFFICE VISIT (OUTPATIENT)
Dept: FAMILY MEDICINE | Facility: CLINIC | Age: 41
End: 2021-09-20
Payer: COMMERCIAL

## 2021-09-20 VITALS
WEIGHT: 157.6 LBS | TEMPERATURE: 97.2 F | SYSTOLIC BLOOD PRESSURE: 104 MMHG | RESPIRATION RATE: 22 BRPM | HEART RATE: 85 BPM | DIASTOLIC BLOOD PRESSURE: 64 MMHG | OXYGEN SATURATION: 100 % | BODY MASS INDEX: 23.34 KG/M2 | HEIGHT: 69 IN

## 2021-09-20 DIAGNOSIS — Z23 NEED FOR TDAP VACCINATION: ICD-10-CM

## 2021-09-20 DIAGNOSIS — S46.811A STRAIN OF TRAPEZIUS MUSCLE, RIGHT, INITIAL ENCOUNTER: ICD-10-CM

## 2021-09-20 DIAGNOSIS — Z00.00 ANNUAL PHYSICAL EXAM: Primary | ICD-10-CM

## 2021-09-20 DIAGNOSIS — M77.11 LATERAL EPICONDYLITIS OF RIGHT ELBOW: ICD-10-CM

## 2021-09-20 LAB
BASOPHILS # BLD AUTO: 0.1 10E3/UL (ref 0–0.2)
BASOPHILS NFR BLD AUTO: 2 %
EOSINOPHIL # BLD AUTO: 0.3 10E3/UL (ref 0–0.7)
EOSINOPHIL NFR BLD AUTO: 3 %
ERYTHROCYTE [DISTWIDTH] IN BLOOD BY AUTOMATED COUNT: 14 % (ref 10–15)
HBA1C MFR BLD: 5.2 % (ref 0–5.6)
HCT VFR BLD AUTO: 41.4 % (ref 40–53)
HGB BLD-MCNC: 14 G/DL (ref 13.3–17.7)
LYMPHOCYTES # BLD AUTO: 3.4 10E3/UL (ref 0.8–5.3)
LYMPHOCYTES NFR BLD AUTO: 39 %
MCH RBC QN AUTO: 31.3 PG (ref 26.5–33)
MCHC RBC AUTO-ENTMCNC: 33.8 G/DL (ref 31.5–36.5)
MCV RBC AUTO: 93 FL (ref 78–100)
MONOCYTES # BLD AUTO: 0.8 10E3/UL (ref 0–1.3)
MONOCYTES NFR BLD AUTO: 9 %
NEUTROPHILS # BLD AUTO: 4.2 10E3/UL (ref 1.6–8.3)
NEUTROPHILS NFR BLD AUTO: 48 %
PLATELET # BLD AUTO: 370 10E3/UL (ref 150–450)
RBC # BLD AUTO: 4.47 10E6/UL (ref 4.4–5.9)
WBC # BLD AUTO: 8.8 10E3/UL (ref 4–11)

## 2021-09-20 PROCEDURE — 83036 HEMOGLOBIN GLYCOSYLATED A1C: CPT | Performed by: FAMILY MEDICINE

## 2021-09-20 PROCEDURE — 99213 OFFICE O/P EST LOW 20 MIN: CPT | Mod: 25 | Performed by: FAMILY MEDICINE

## 2021-09-20 PROCEDURE — 36415 COLL VENOUS BLD VENIPUNCTURE: CPT | Performed by: FAMILY MEDICINE

## 2021-09-20 PROCEDURE — 80061 LIPID PANEL: CPT | Performed by: FAMILY MEDICINE

## 2021-09-20 PROCEDURE — 99396 PREV VISIT EST AGE 40-64: CPT | Mod: 25 | Performed by: FAMILY MEDICINE

## 2021-09-20 PROCEDURE — 80053 COMPREHEN METABOLIC PANEL: CPT | Performed by: FAMILY MEDICINE

## 2021-09-20 PROCEDURE — 90715 TDAP VACCINE 7 YRS/> IM: CPT | Performed by: FAMILY MEDICINE

## 2021-09-20 PROCEDURE — 90471 IMMUNIZATION ADMIN: CPT | Performed by: FAMILY MEDICINE

## 2021-09-20 PROCEDURE — 85025 COMPLETE CBC W/AUTO DIFF WBC: CPT | Performed by: FAMILY MEDICINE

## 2021-09-20 RX ORDER — CYCLOBENZAPRINE HCL 5 MG
TABLET ORAL
Qty: 30 TABLET | Refills: 0 | Status: SHIPPED | OUTPATIENT
Start: 2021-09-20

## 2021-09-20 RX ORDER — PREDNISONE 20 MG/1
TABLET ORAL
Qty: 15 TABLET | Refills: 0 | Status: SHIPPED | OUTPATIENT
Start: 2021-09-20

## 2021-09-20 ASSESSMENT — ENCOUNTER SYMPTOMS
SORE THROAT: 0
WEAKNESS: 0
SHORTNESS OF BREATH: 0
HEADACHES: 0
FEVER: 0
HEMATURIA: 0
CONSTIPATION: 1
FREQUENCY: 0
HEMATOLOGIC/LYMPHATIC NEGATIVE: 1
HEARTBURN: 1
EYE PAIN: 0
PALPITATIONS: 0
ALLERGIC/IMMUNOLOGIC NEGATIVE: 1
ARTHRALGIAS: 1
DYSURIA: 0
DIZZINESS: 0
PARESTHESIAS: 1
COUGH: 0
DIARRHEA: 1
ABDOMINAL PAIN: 0
HEMATOCHEZIA: 1
JOINT SWELLING: 1
NERVOUS/ANXIOUS: 0
CHILLS: 0
NAUSEA: 0
MYALGIAS: 1
ENDOCRINE NEGATIVE: 1

## 2021-09-20 ASSESSMENT — PAIN SCALES - GENERAL: PAINLEVEL: SEVERE PAIN (7)

## 2021-09-20 ASSESSMENT — MIFFLIN-ST. JEOR: SCORE: 1610.25

## 2021-09-20 NOTE — PATIENT INSTRUCTIONS
Patient Education     Treating Tennis Elbow    Your treatment will depend on how inflamed your tendon is. The goal is to ease your symptoms and help you regain full use of your elbow.  Rest and medicine  Wear a tennis elbow splint to let the inflamed tendon rest and heal. It must be worn correctly. It should be placed down the arm past the painful area of the elbow. It can make symptoms worse if it's directly over the inflamed tendon. Take stress off the tendon by using your other hand or changing your . Take NSAIDs (nonsteroidal anti-inflammatory drugs) and use ice to ease pain and swelling.  Exercises and therapy  Your healthcare provider may give you an exercise program. He or she may send you to a physical therapist. That expert will teach you how to gently stretch and strengthen the muscles around your elbow.  Anti-inflammatory shots  Your healthcare provider may give you shots of an anti-inflammatory medicine such as cortisone. This helps ease swelling. You may have more pain at first. But your elbow should feel better in a few days.  If surgery is needed  If your symptoms go on for a long time, or other treatments don t work, your healthcare provider may recommend surgery. Surgery repairs the inflamed tendon.  [place] last reviewed this educational content on 1/1/2018 2000-2021 [employer]. All rights reserved. This information is not intended as a substitute for professional medical care. Always follow your healthcare professional's instructions.           Patient Education     Tennis Elbow  Muscles connect to bones by thick, fibrous cords (tendons). When the muscles are overused by repeated motion, the tendons may become inflamed and painful. This condition is called tendonitis.   Tennis elbow (lateral epicondylitis) is a form of tendonitis. It occurs when the forearm muscles are used again and again in a twisting motion. Pain from tennis elbow occurs mainly on the outside of the  elbow. But the pain can spread into the forearm and wrist. Your elbow may also be swollen and tender to the touch.   The pain may get worse when you move your arm or do certain activities. Bending your wrist back, shaking hands, or turning a doorknob may cause pain. The pain often gets worse after several weeks or months. Sometimes you may feel pain when your arm is still.   Tennis players who use a backhand stroke with poor technique are more likely to get tennis elbow. But playing tennis is only one cause of tennis elbow. Other common activities that can cause it include:     Hammering    Painting    Raking  Besides tennis players, people at risk include , gardeners, musicians, and dentists. Sometimes people get tennis elbow without doing anything that would cause the injury.   Treatment includes resting the arm and taking anti-inflammatory medicines. Special splints can help ease symptoms. Symptoms should get better after 4 to 6 weeks of rest. You may need steroid injections if resting and using a splint don t help. After the pain is relieved, you should change your activities so the symptoms don t return. You may need physical therapy. It may include stretching, range-of-motion, and strengthening exercises. These treatments help most cases. You may need surgery if your symptoms continue for 6 months despite treatment.   Home care  Follow these guidelines when caring for yourself at home:    Rest your elbow as needed. Protect it from movement that causes pain. You may be told to use a forearm splint at night to ease symptoms in the morning. Your healthcare provider may recommend a special wrap or splint to compress the muscles of the forearm. This can ease pain during daytime activities. As your symptoms get better, start to move your elbow more.    Put an ice pack on the injured area. Do this for 20 minutes every 1 to 2 hours the first day for pain relief. You can make an ice pack by wrapping a plastic  bag of ice cubes in a thin towel. Continue using the ice pack 3 to 4 times a day for the next several days. Then use the ice pack as needed to ease pain and swelling.    You may use acetaminophen or ibuprofen to control pain, unless another pain medicine was prescribed. If you have chronic liver or kidney disease, talk with your healthcare provider before using these medicines. Also talk with your provider if you ve had a stomach ulcer or gastrointestinal bleeding.    After your elbow heals, avoid the motion that caused your pain. Or learn to move in a way that causes less stress on the tendon. Using a forearm wrap may keep tennis elbow from happening again.    A tennis elbow strap may ease pain and keep you from further injury when you start playing tennis again. You can also lower your risk for injury by warming up before you play and cooling down afterward. You should also use the right equipment. For instance, make sure your racquet has the right  and is the right size for you.  Follow-up care  Follow up with your healthcare provider as advised, if your symptoms don t get better after 2 to 3 weeks of treatment.   When to seek medical advice  Call your healthcare provider right away if any of these occur:    Redness over the painful area    Pain, stiffness,  or swelling at the elbow gets worse    Any numbness or tingling in your arm, hands, or fingers    Unexplained fever over 100.4 F (38 C)    Chills  [place] last reviewed this educational content on 12/1/2019 2000-2021 [employer]. All rights reserved. This information is not intended as a substitute for professional medical care. Always follow your healthcare professional's instructions.           Patient Education     Understanding Medial Epicondylitis    Several muscles attach to the arm at the elbow joint. The tough bands of tissue that attach muscle to bones are called tendons. The bone in the upper arm has knobs on the farthest end  called epicondyles. Tendons attach some arm muscles to these knobs. The tissues in this area can become irritated.   Epicondylitis is the medical term for a painful elbow over the epicondyle. Medial refers to the inner side of the elbow. Medial epicondylitis is sometimes called  golfer s elbow.     How to say it  [id number]   Causes of medial epicondylitis  A painful inner elbow may be caused by:    Using an elbow or hand the same way over and over    Using poor form or too much force in a sport such as golf, tennis, or baseball    Lifting too heavy a weight    Other injuries to the arm or elbow  Symptoms of medial epicondylitis    Pain or tenderness on the inside of the elbow that may travel down the forearm    Pain when moving the wrist    Pain or weakness when gripping something    A crackling sound or grating feeling when moving the elbow    Treatment for medial epicondylitis  Treatments may include:    Avoiding or changing the action that caused the problem. This helps prevent irritating the tissues more.    Prescription or over-the-counter medicines. These help reduce inflammation, swelling, and pain.    Braces. A counter-force brace can help reduce tendon strain. This allows the joint to heal.    Cold or heat packs. These help reduce pain and swelling.    Stretching and other exercises. These improve flexibility and strength.    Physical therapy. This may include exercises or other treatments.    Injections of medicine. This may relieve symptoms.  If other treatments don't relieve symptoms, you may need surgery.   Possible complications  If you don t give your elbow time to heal, symptoms may return or get worse. Follow your healthcare provider s instructions on resting and treating your elbow.   When to call your healthcare provider  Call your healthcare provider right away if you have any of these:    Fever of 100.4 F (38 C) or higher, or as directed by your  provider    Chills    Redness, swelling, or warmth that gets worse    Symptoms that don t get better with prescribed medicines, or get worse    New symptoms  [place] last reviewed this educational content on 6/1/2019 2000-2021 [employer]. All rights reserved. This information is not intended as a substitute for professional medical care. Always follow your healthcare professional's instructions.

## 2021-09-20 NOTE — LETTER
September 20, 2021      Adrian Kurtz  [address]   [place] [address]        To Whom It May Concern:    Adrian Kurtz  was seen on 09/20/2021.   Return to work on 09/21/2021, with following restrictions: for the following next week.    Lift up to 30 ib.  Avoid over using right hand/ shoulder and elbow.  ( no repetitive motions )  Avoid over head, over reach using right hand.      Sincerely,        Darrell Jiang MD

## 2021-09-20 NOTE — LETTER
September 20, 2021      Adrian Kurtz  [address]   [place] [address]        To Whom It May Concern:    Adrian Kurtz  was seen on 09/20/2021.  Please excuse him  until 09/20/2021 due to illness.        Sincerely,        Darrell Jiang MD

## 2021-09-20 NOTE — PROGRESS NOTES
SUBJECTIVE:   CC: Adrian Kurtz is an 41 year old male who presents for preventative health visit.   Patient comes for a physical exam.  No past medical history.  Mother with history of diabetes.    Patient reports to his right elbow, right shoulder has been stiff, tenderness for the past 2 months.  He is right-hand dominant.    He works as a   he lifts a lot of metal sheet, lots of lifting and moving.    He has no swelling, no loss of strength or sensation.  However, sometimes feels weaker.    Patient has been advised of split billing requirements and indicates understanding: Yes  Healthy Habits:     Getting at least 3 servings of Calcium per day:  Yes    Bi-annual eye exam:  NO    Dental care twice a year:  NO    Sleep apnea or symptoms of sleep apnea:  Sleep apnea    Diet:  Regular (no restrictions)    Frequency of exercise:  4-5 days/week    Duration of exercise:  Greater than 60 minutes    Taking medications regularly:  No    Medication side effects:  None    PHQ-2 Total Score: 0    Additional concerns today:  Yes      Today's PHQ-2 Score:   PHQ-2 ( 1999 [employer]) 9/20/2021   Q1: Little interest or pleasure in doing things 0   Q2: Feeling down, depressed or hopeless 0   PHQ-2 Score 0   Q1: Little interest or pleasure in doing things Not at all   Q2: Feeling down, depressed or hopeless Not at all   PHQ-2 Score 0       Abuse: Current or Past(Physical, Sexual or Emotional)- No  Do you feel safe in your environment? Yes    Have you ever done Advance Care Planning? (For example, a Health Directive, POLST, or a discussion with a medical provider or your loved ones about your wishes): No, advance care planning information given to patient to review.  Patient declined advance care planning discussion at this time.    Social History     Tobacco Use     Smoking status: Current Every Day Smoker     Packs/day: 1.00     Years: 12.00     Pack years: 12.00     Types: Cigarettes, Cigars      Smokeless tobacco: Never Used   Substance Use Topics     Alcohol use: Yes     Alcohol/week: 16.7 standard drinks     Types: 20 Standard drinks or equivalent per week     If you drink alcohol do you typically have >3 drinks per day or >7 drinks per week? Yes      Alcohol Use 9/20/2021   Prescreen: >3 drinks/day or >7 drinks/week? No   Prescreen: >3 drinks/day or >7 drinks/week? -   No flowsheet data found.    Last PSA: No results found for: PSA    Reviewed orders with patient. Reviewed health maintenance and updated orders accordingly - Yes  Labs reviewed in EPIC  BP Readings from Last 3 Encounters:   09/20/21 104/64   09/25/19 106/68   08/23/19 102/68    Wt Readings from Last 3 Encounters:   09/20/21 71.5 kg (157 lb 9.6 oz)   09/25/19 73 kg (161 lb)   08/23/19 75.8 kg (167 lb)                  Patient Active Problem List   Diagnosis     Hyperlipidemia with target LDL less than 130     AR (allergic rhinitis)     Eczema     DDD (degenerative disc disease), cervical     Tobacco use disorder     Past Surgical History:   Procedure Laterality Date     OPEN REDUCTION INTERNAL FIXATION WRIST  6-04    Right wrist fracture with ORIF     REPLACE DISK CERVICAL ANTERIOR N/A 2/6/2018    Procedure: REPLACE DISK CERVICAL ANTERIOR;  C5-6 ARTHROPLASTY ;  Surgeon: Dewayne Squires MD;  Location:  OR       Social History     Tobacco Use     Smoking status: Current Every Day Smoker     Packs/day: 1.00     Years: 12.00     Pack years: 12.00     Types: Cigarettes, Cigars     Smokeless tobacco: Never Used   Substance Use Topics     Alcohol use: Yes     Alcohol/week: 16.7 standard drinks     Types: 20 Standard drinks or equivalent per week     Family History   Problem Relation Age of Onset     Diabetes Mother      Connective Tissue Disorder Mother         lupus     Diabetes Sister      Diabetes Sister      C.A.D. Maternal Grandmother 71     C.A.D. Paternal Grandfather 66     Respiratory Brother         Sarcoidosis     Cancer Other          lung cancer uncles on both sides, pancreatic cancer - uncle, liver cancer aunt     Gastrointestinal Disease Other         Uncle cirrhosis - alcohol     Endocrine Disease Other         nephew without parathyroid glands     Musculoskeletal Disorder Other         osteogenesis imperfecta - aunt     Hypertension No family hx of      Thyroid Disease No family hx of          Current Outpatient Medications   Medication Sig Dispense Refill     cyclobenzaprine (FLEXERIL) 10 MG tablet Take 1 tablet (10 mg) by mouth 3 times daily as needed for muscle spasms (Patient not taking: Reported on 9/20/2021) 60 tablet 0     HYDROcodone-acetaminophen (NORCO) 5-325 MG per tablet Take 1-2 tablets by mouth every 8 hours as needed for moderate to severe pain (Patient not taking: Reported on 8/16/2019) 50 tablet 0     methylPREDNISolone (MEDROL DOSEPAK) 4 MG tablet Follow package instructions (Patient not taking: Reported on 8/16/2019) 21 tablet 0     nabumetone (RELAFEN) 750 MG tablet Take 1 tablet (750 mg) by mouth 2 times daily as needed for pain (Patient not taking: Reported on 9/20/2021) 30 tablet 0     predniSONE (DELTASONE) 20 MG tablet 3 tablets daily x 3 days, then 2 tabs daily for 2 days, take medication in am except for first day. (Patient not taking: Reported on 9/20/2021)       tiZANidine (ZANAFLEX) 4 MG capsule Take 1 capsule (4 mg) by mouth At Bedtime Neck pain (Patient not taking: Reported on 9/20/2021) 30 capsule 0     triamcinolone (ARISTOCORT HP) 0.5 % external cream Apply 0.1 applicators topically 2 times daily (Patient not taking: Reported on 9/20/2021)       varenicline (CHANTIX KATIE) 0.5 MG X 11 & 1 MG X 42 tablet Take 0.5 mg tab daily for 3 days, THEN 0.5 mg tab twice daily for 4 days, THEN 1 mg twice daily. (Patient not taking: Reported on 9/20/2021) 53 tablet 0     varenicline (CHANTIX) 1 MG tablet Take 1 tablet (1 mg) by mouth 2 times daily (Patient not taking: Reported on 8/23/2019) 56 tablet 2     Allergies    Allergen Reactions     Iodine Hives     Morphine Hives and Nausea and Vomiting     Percocet [Oxycodone-Acetaminophen] Nausea and Vomiting and Hives     Recent Labs   Lab Test 08/23/19  1145 12/22/17  1033   * 139*   HDL 58 55   TRIG 122 114   ALT 36 23   CR 0.94 1.23   GFRESTIMATED >90 66   GFRESTBLACK >90 80   POTASSIUM 4.0 4.5        Reviewed and updated as needed this visit by clinical staff  Tobacco  Allergies  Meds   Med Hx  Surg Hx  Fam Hx  Soc Hx        Reviewed and updated as needed this visit by Provider                Past Medical History:   Diagnosis Date     AR (allergic rhinitis)      Eczema      Hemorrhoids      Hyperlipidemia LDL goal < 130       Past Surgical History:   Procedure Laterality Date     OPEN REDUCTION INTERNAL FIXATION WRIST  6-04    Right wrist fracture with ORIF     REPLACE DISK CERVICAL ANTERIOR N/A 2/6/2018    Procedure: REPLACE DISK CERVICAL ANTERIOR;  C5-6 ARTHROPLASTY ;  Surgeon: Dewayne Squires MD;  Location: [facility] OR     OB History   No obstetric history on file.       Review of Systems   Constitutional: Negative for chills and fever.   HENT: Negative for congestion, ear pain, hearing loss and sore throat.    Eyes: Negative for pain and visual disturbance.   Respiratory: Negative for cough and shortness of breath.    Cardiovascular: Positive for chest pain and peripheral edema. Negative for palpitations.   Gastrointestinal: Positive for constipation, diarrhea, heartburn and hematochezia. Negative for abdominal pain and nausea.   Endocrine: Negative.    Genitourinary: Positive for urgency. Negative for discharge, dysuria, frequency, genital sores, hematuria and impotence.   Musculoskeletal: Positive for arthralgias, joint swelling and myalgias.   Skin: Negative for rash.   Allergic/Immunologic: Negative.    Neurological: Positive for paresthesias. Negative for dizziness, weakness and headaches.   Hematological: Negative.    Psychiatric/Behavioral: Negative for mood  "changes. The patient is not nervous/anxious.      CONSTITUTIONAL: NEGATIVE for fever, chills, change in weight  INTEGUMENTARY/SKIN: NEGATIVE for worrisome rashes, moles or lesions  EYES: NEGATIVE for vision changes or irritation  ENT: NEGATIVE for ear, mouth and throat problems  RESP: NEGATIVE for significant cough or SOB  CV: NEGATIVE for chest pain, palpitations or peripheral edema  GI: NEGATIVE for nausea, abdominal pain, heartburn, or change in bowel habits   male: negative for dysuria, hematuria, decreased urinary stream, erectile dysfunction, urethral discharge  MUSCULOSKELETAL: NEGATIVE for significant arthralgias or myalgia  NEURO: NEGATIVE for weakness, dizziness or paresthesias  ENDOCRINE: NEGATIVE for temperature intolerance, skin/hair changes  HEME/ALLERGY/IMMUNE: NEGATIVE for bleeding problems  PSYCHIATRIC: NEGATIVE for changes in mood or affect    OBJECTIVE:   /64 (BP Location: Right arm, Patient Position: Chair, Cuff Size: Adult Regular)   Pulse 85   Temp 97.2  F (36.2  C) (Oral)   Resp 22   Ht 1.753 m (5' 9\")   Wt 71.5 kg (157 lb 9.6 oz)   SpO2 100%   BMI 23.27 kg/m      Physical Exam  GENERAL: healthy, alert and no distress  HENT: ear canals and TM's normal, nose and mouth without ulcers or lesions  NECK: no adenopathy, no asymmetry, masses, or scars and thyroid normal to palpation  RESP: lungs clear to auscultation - no rales, rhonchi or wheezes  CV: regular rate and rhythm, normal S1 S2, no S3 or S4, no murmur, click or rub, no peripheral edema and peripheral pulses strong  ABDOMEN: soft, nontender, no hepatosplenomegaly, no masses and bowel sounds normal  MS: Right elbow tenderness, lateral side.  Full range of motion, full strength.  Right shoulder exam full range of motion stiffness in the trapezius muscle area.  Upper extremity strength intact, full sensation.  Good handgrip.  SKIN: no suspicious lesions or rashes  NEURO: Normal strength and tone, mentation intact and speech " normal  PSYCH: mentation appears normal, affect normal/bright      Diagnostic Test Results:  Labs reviewed in Epic  Orders Placed This Encounter   Procedures     SCREENING QUESTIONS FOR ADULT IMMUNIZATIONS     TDAP VACCINE (Adacel, Boostrix)  [[id number]]     Hemoglobin A1c     Comprehensive metabolic panel (BMP + Alb, Alk Phos, ALT, AST, Total. Bili, TP)     Lipid panel reflex to direct LDL Fasting     CBC with platelets and differential     Wrist/Arm/Hand Supplies Order for DME - ONLY FOR DME       ASSESSMENT/PLAN:   (Z00.00) Annual physical exam  (primary encounter diagnosis)  Comment:   Plan: CBC with platelets and differential, Hemoglobin        A1c, Comprehensive metabolic panel (BMP + Alb,         Alk Phos, ALT, AST, Total. Bili, TP), Lipid         panel reflex to direct LDL Fasting        Routine preventive care discussed.  Patient tetanus vaccine was updated.  Declined influenza vaccine today.  Advised with diet, exercise, weight loss.      (M77.11) Lateral epicondylitis of right elbow  Comment: Likely tendinitis, muscle strain.  Plan: predniSONE (DELTASONE) 20 MG tablet,         cyclobenzaprine (FLEXERIL) 5 MG tablet,         Wrist/Arm/Hand Supplies Order for DME - ONLY         FOR DME        Advised patient with RICE therapy.  Was fitted with a elbow band brace.  Given a note to go back to work with a few restriction for the next week.    (Z23) Need for Tdap vaccination  Comment: given   Plan: TDAP VACCINE (Adacel, Boostrix)  [[id number]],         SCREENING QUESTIONS FOR ADULT IMMUNIZATIONS            (S46.811A) Strain of trapezius muscle, right, initial encounter  Comment:   Plan: predniSONE (DELTASONE) 20 MG tablet,         cyclobenzaprine (FLEXERIL) 5 MG tablet         muscle strain.  Rice therapy, work restrictions.    Patient has been advised of split billing requirements and indicates understanding: Yes  COUNSELING:   Reviewed preventive health counseling, as reflected in patient instructions        "Regular exercise       Healthy diet/nutrition       Vision screening       Hearing screening       Immunizations    Vaccinated for: TDAP          Estimated body mass index is 23.27 kg/m  as calculated from the following:    Height as of this encounter: 1.753 m (5' 9\").    Weight as of this encounter: 71.5 kg (157 lb 9.6 oz).     Weight management plan: Discussed healthy diet and exercise guidelines    He reports that he has been smoking cigarettes and cigars. He has a 12.00 pack-year smoking history. He has never used smokeless tobacco.  Tobacco Cessation Action Plan:   Self help information given to patient      Counseling Resources:  ATP IV Guidelines  Pooled Cohorts Equation Calculator  FRAX Risk Assessment  ICSI Preventive Guidelines  Dietary Guidelines for Americans, 2010  USDA's MyPlate  ASA Prophylaxis  Lung CA Screening    Darrell Jiang MD  [facility]  "

## 2021-09-20 NOTE — NURSING NOTE
Clinic Administered Medication Documentation          Injectable Medication Documentation    Patient was given TDap vaccine. Prior to medication administration, verified patients identity using patient s name and date of birth. Please see MAR and medication order for additional information. Patient instructed to remain in clinic for 15 minutes and report any adverse reaction to staff immediately .      Was entire vial of medication used? Yes  Vial/Syringe: Syringe  Expiration Date:  03/18/2023  Was this medication supplied by the patient? No   Oral Chao CMA on 9/20/2021 at 2:21 PM

## 2021-09-21 ENCOUNTER — TELEPHONE (OUTPATIENT)
Dept: FAMILY MEDICINE | Facility: CLINIC | Age: 41
End: 2021-09-21

## 2021-09-21 LAB
ALBUMIN SERPL-MCNC: 4.3 G/DL (ref 3.4–5)
ALP SERPL-CCNC: 61 U/L (ref 40–150)
ALT SERPL W P-5'-P-CCNC: 41 U/L (ref 0–70)
ANION GAP SERPL CALCULATED.3IONS-SCNC: 5 MMOL/L (ref 3–14)
AST SERPL W P-5'-P-CCNC: 23 U/L (ref 0–45)
BILIRUB SERPL-MCNC: 0.4 MG/DL (ref 0.2–1.3)
BUN SERPL-MCNC: 11 MG/DL (ref 7–30)
CALCIUM SERPL-MCNC: 9.6 MG/DL (ref 8.5–10.1)
CHLORIDE BLD-SCNC: 108 MMOL/L (ref 94–109)
CHOLEST SERPL-MCNC: 263 MG/DL
CO2 SERPL-SCNC: 27 MMOL/L (ref 20–32)
CREAT SERPL-MCNC: 0.88 MG/DL (ref 0.66–1.25)
FASTING STATUS PATIENT QL REPORTED: ABNORMAL
GFR SERPL CREATININE-BSD FRML MDRD: >90 ML/MIN/1.73M2
GLUCOSE BLD-MCNC: 91 MG/DL (ref 70–99)
HDLC SERPL-MCNC: 69 MG/DL
LDLC SERPL CALC-MCNC: 178 MG/DL
NONHDLC SERPL-MCNC: 194 MG/DL
POTASSIUM BLD-SCNC: 3.3 MMOL/L (ref 3.4–5.3)
PROT SERPL-MCNC: 7.8 G/DL (ref 6.8–8.8)
SODIUM SERPL-SCNC: 140 MMOL/L (ref 133–144)
TRIGL SERPL-MCNC: 78 MG/DL

## 2021-09-21 NOTE — TELEPHONE ENCOUNTER
It was my first visit with pt. He came for physical exam.  Had multiple joint pain, right arm, shoulder, low back and right knee and hip. Likely musculoskeletal Pain and arthritis .  His right elbow was more tendinitis.  Given a note for work restrictions.    I would advise to start medications, Prednisone.  Follow up in 1-2 weeks if continue to have pain.    He did not ask for STD screening, we could check on his office visit  thanks

## 2021-09-21 NOTE — TELEPHONE ENCOUNTER
"    RN left  for patient at [phone] requesting call back to clinic.    RN called to relay providers message      ----- Message from Darrell Jiang MD sent at 9/21/2021  9:16 AM CDT -----  Please call pt with results  Normal for liver and kidney functions  Normal for blood sugar, and A1c  Potassium on low side  Advise pt. To eat more food rich in potassium.    Total cholesterol and LDL on high side  Advise pt. With diet and avoid fatty food.    Ways to improve your cholesterol...     1- Eats less saturated fats (including avoiding \"trans\" fats), fatty foods.  Eat more baked food, than fried food.     2 - Eat more unsaturated fats  - found in vegetables, grains, and tree nuts.  Also by replacing butter with canola oil or olive oil.     3 - Eat more nuts.  1-2 ounces (a small handful) of almonds, walnuts, hazelnuts or pecans once a day in place of other less healthy snacks.     4 - Eat more high fiber foods - vegetables and whole grains including oat bran, oats, beans, peas, and flax seed.     5 - Eat more fish - such as salmon, tuna, mackerel, and sardines.  1 or 2 six ounce servings per week is a healthy replacement for other proteins.     6 - Exercise for at least 120 minutes per week - which is equal to 30 minutes 4 days per week.    7- avoid late meals, at least  a few hours before bedtime.    Thanks      Tim Day, RN, BSN, PHN  [facility]"

## 2021-09-21 NOTE — TELEPHONE ENCOUNTER
Explained message to patient.  Advised he call back early next week if his pain has not improved so we can get him on the schedule to see Dr Jiang.  At that appointment Dr Jiang said he would do STD screening.    Pt understands advice and will follow up as indicated      Leesa Rodriguez RN

## 2021-09-21 NOTE — TELEPHONE ENCOUNTER
Patient called back to go over lab results.   Talked about diet changes and exercise    He has some other concerns as well:    Arm/shoulder pain-Neck surgery in 2018- Right shoulder, Forearm, bicep area.    During visit he was given prednisone to help with inflammation and muscle relaxer.  He is concerned that something else might be going on and would like an MRI or something like that done to determine if there is damage. Explained importance of reducing inflammation. 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    Patient was also wondering about STD testing- he does not have any symptoms just want a check up for sexual health- unsure if we can add any orders to existing labs?    Ok to leave detailed message on pt's phone      Leesa Rodriguez RN

## (undated) DEVICE — ESU CORD BIPOLAR 12' E0509

## (undated) DEVICE — LINEN TOWEL PACK X5 5464

## (undated) DEVICE — ESU CLEANER TIP 31142717

## (undated) DEVICE — ESU GROUND PAD UNIVERSAL W/O CORD

## (undated) DEVICE — RX SURGIFLO HEMOSTATIC MATRIX W/THROMBIN 8ML 2994

## (undated) DEVICE — GLOVE PROTEXIS W/NEU-THERA 8.5  2D73TE85

## (undated) DEVICE — NDL SPINAL 18GA 3.5" 405184

## (undated) DEVICE — GLOVE PROTEXIS W/NEU-THERA 7.5  2D73TE75

## (undated) DEVICE — SU DERMABOND ADVANCED .7ML DNX12

## (undated) DEVICE — MANIFOLD NEPTUNE 4 PORT 700-20

## (undated) DEVICE — SPONGE KITTNER 31001010

## (undated) DEVICE — GLOVE PROTEXIS BLUE W/NEU-THERA 8.5  2D73EB85

## (undated) DEVICE — PREP DURAPREP 06ML APL 8635

## (undated) DEVICE — ESU ELEC BLADE 2.75" COATED/INSULATED E1455

## (undated) DEVICE — BLADE CLIPPER 4412A

## (undated) DEVICE — ADH LIQUID MASTISOL TOPICAL VIAL 2-3ML 0523-48

## (undated) DEVICE — SPONGE SURGIFOAM 100 1974

## (undated) DEVICE — DRAPE COVER C-ARM SEAMLESS SNAP-KAP 03-KP26 LATEX FREE

## (undated) DEVICE — MIDAS REX DISSECTING TOOL  14MH30

## (undated) DEVICE — DRAPE SHEET REV FOLD 3/4 9349

## (undated) DEVICE — SU VICRYL 3-0 SH CR 8X18" J774

## (undated) DEVICE — PACK SPINE SM CUSTOM SNE15SSFSK

## (undated) DEVICE — TUBING SUCTION SOFT 20'X3/16" 0036570

## (undated) DEVICE — DRAIN JACKSON PRATT 07FR ROUND SU130-1320

## (undated) DEVICE — DRAPE MICROSCOPE EQUIP 48X120" 6130VL2

## (undated) DEVICE — Device

## (undated) DEVICE — GLOVE PROTEXIS BLUE W/NEU-THERA 8.0  2D73EB80

## (undated) DEVICE — SOL ADH LIQUID BENZOIN SWAB 0.6ML C1544

## (undated) DEVICE — SU MONOCRYL 4-0 P-3 18" UND Y494G

## (undated) DEVICE — DRAPE MAYO STAND 23X54 8337

## (undated) DEVICE — DRAPE POUCH INSTRUMENT 1018

## (undated) RX ORDER — GLYCOPYRROLATE 0.2 MG/ML
INJECTION, SOLUTION INTRAMUSCULAR; INTRAVENOUS
Status: DISPENSED
Start: 2018-02-06

## (undated) RX ORDER — HYDROMORPHONE HYDROCHLORIDE 1 MG/ML
INJECTION, SOLUTION INTRAMUSCULAR; INTRAVENOUS; SUBCUTANEOUS
Status: DISPENSED
Start: 2018-02-06

## (undated) RX ORDER — FENTANYL CITRATE 50 UG/ML
INJECTION, SOLUTION INTRAMUSCULAR; INTRAVENOUS
Status: DISPENSED
Start: 2018-02-06

## (undated) RX ORDER — BUPIVACAINE HYDROCHLORIDE AND EPINEPHRINE 5; 5 MG/ML; UG/ML
INJECTION, SOLUTION EPIDURAL; INTRACAUDAL; PERINEURAL
Status: DISPENSED
Start: 2018-02-06

## (undated) RX ORDER — DEXAMETHASONE SODIUM PHOSPHATE 4 MG/ML
INJECTION, SOLUTION INTRA-ARTICULAR; INTRALESIONAL; INTRAMUSCULAR; INTRAVENOUS; SOFT TISSUE
Status: DISPENSED
Start: 2018-02-06

## (undated) RX ORDER — PROPOFOL 10 MG/ML
INJECTION, EMULSION INTRAVENOUS
Status: DISPENSED
Start: 2018-02-06

## (undated) RX ORDER — ONDANSETRON 2 MG/ML
INJECTION INTRAMUSCULAR; INTRAVENOUS
Status: DISPENSED
Start: 2018-02-06

## (undated) RX ORDER — HYDROCODONE BITARTRATE AND ACETAMINOPHEN 5; 325 MG/1; MG/1
TABLET ORAL
Status: DISPENSED
Start: 2018-02-06

## (undated) RX ORDER — LIDOCAINE HYDROCHLORIDE 20 MG/ML
INJECTION, SOLUTION EPIDURAL; INFILTRATION; INTRACAUDAL; PERINEURAL
Status: DISPENSED
Start: 2018-02-06